# Patient Record
Sex: MALE | Race: WHITE | NOT HISPANIC OR LATINO | Employment: OTHER | URBAN - METROPOLITAN AREA
[De-identification: names, ages, dates, MRNs, and addresses within clinical notes are randomized per-mention and may not be internally consistent; named-entity substitution may affect disease eponyms.]

---

## 2023-02-27 LAB
CALCIDIOL (25 OH VITAMIN D3) (NG/ML) IN SER/PLAS: 22 NG/ML
CHOLESTEROL (MG/DL) IN SER/PLAS: 243 MG/DL (ref 0–199)
CHOLESTEROL IN HDL (MG/DL) IN SER/PLAS: 42.1 MG/DL
CHOLESTEROL/HDL RATIO: 5.8
COBALAMIN (VITAMIN B12) (PG/ML) IN SER/PLAS: 345 PG/ML (ref 211–911)
FASTING GLUCOSE (MG/DL) IN SER/PLAS: 97 MG/DL (ref 74–99)
INSULIN, FASTING: 12 UIU/ML (ref 3–25)
LDL: 148 MG/DL (ref 0–99)
MAGNESIUM (MG/DL) IN SER/PLAS: 1.99 MG/DL (ref 1.6–2.4)
NON HDL CHOLESTEROL: 201 MG/DL
THYROPEROXIDASE AB (IU/ML) IN SER/PLAS: 988 IU/ML
THYROTROPIN (MIU/L) IN SER/PLAS BY DETECTION LIMIT <= 0.05 MIU/L: 0.02 MIU/L (ref 0.44–3.98)
THYROXINE (T4) FREE (NG/DL) IN SER/PLAS: 1.55 NG/DL (ref 0.78–1.48)
TRIGLYCERIDE (MG/DL) IN SER/PLAS: 264 MG/DL (ref 0–149)
TRIIODOTHYRONINE (T3) FREE (PG/ML) IN SER/PLAS: 4.4 PG/ML (ref 2.3–4.2)
VLDL: 53 MG/DL (ref 0–40)

## 2023-03-01 LAB
ALANINE AMINOTRANSFERASE (SGPT) (U/L) IN SER/PLAS: 17 U/L (ref 10–52)
ALBUMIN (G/DL) IN SER/PLAS: 4.5 G/DL (ref 3.4–5)
ALKALINE PHOSPHATASE (U/L) IN SER/PLAS: 86 U/L (ref 33–120)
ANION GAP IN SER/PLAS: 12 MMOL/L (ref 10–20)
ASPARTATE AMINOTRANSFERASE (SGOT) (U/L) IN SER/PLAS: 14 U/L (ref 9–39)
BILIRUBIN TOTAL (MG/DL) IN SER/PLAS: 0.8 MG/DL (ref 0–1.2)
CALCIUM (MG/DL) IN SER/PLAS: 10.1 MG/DL (ref 8.6–10.6)
CARBON DIOXIDE, TOTAL (MMOL/L) IN SER/PLAS: 28 MMOL/L (ref 21–32)
CHLORIDE (MMOL/L) IN SER/PLAS: 104 MMOL/L (ref 98–107)
CREATININE (MG/DL) IN SER/PLAS: 0.94 MG/DL (ref 0.5–1.3)
GFR MALE: >90 ML/MIN/1.73M2
GLUCOSE (MG/DL) IN SER/PLAS: 95 MG/DL (ref 74–99)
POTASSIUM (MMOL/L) IN SER/PLAS: 5.2 MMOL/L (ref 3.5–5.3)
PROTEIN TOTAL: 6.8 G/DL (ref 6.4–8.2)
SODIUM (MMOL/L) IN SER/PLAS: 139 MMOL/L (ref 136–145)
UREA NITROGEN (MG/DL) IN SER/PLAS: 11 MG/DL (ref 6–23)

## 2023-03-03 LAB — VITAMIN B6: 65.8 NMOL/L (ref 20–125)

## 2023-03-05 LAB — VITAMIN B1, WHOLE BLOOD: 157 NMOL/L (ref 70–180)

## 2023-04-18 ENCOUNTER — APPOINTMENT (OUTPATIENT)
Dept: PRIMARY CARE | Facility: CLINIC | Age: 48
End: 2023-04-18
Payer: COMMERCIAL

## 2023-05-16 PROBLEM — G47.33 OSA (OBSTRUCTIVE SLEEP APNEA): Status: ACTIVE | Noted: 2023-05-16

## 2023-05-16 PROBLEM — R07.89 ATYPICAL CHEST PAIN: Status: ACTIVE | Noted: 2023-05-16

## 2023-05-16 PROBLEM — I47.29 VENTRICULAR TACHYCARDIA, MONOMORPHIC (MULTI): Status: ACTIVE | Noted: 2023-05-16

## 2023-05-16 PROBLEM — R19.5 LOOSE STOOLS: Status: ACTIVE | Noted: 2023-05-16

## 2023-05-16 PROBLEM — F33.9 DEPRESSION, MAJOR, RECURRENT (CMS-HCC): Status: ACTIVE | Noted: 2023-05-16

## 2023-05-16 PROBLEM — I47.29 NONSUSTAINED VENTRICULAR TACHYCARDIA (MULTI): Status: ACTIVE | Noted: 2023-05-16

## 2023-05-16 PROBLEM — R39.89 SENSATION OF PRESSURE IN BLADDER AREA: Status: ACTIVE | Noted: 2023-05-16

## 2023-05-16 PROBLEM — R55 SYNCOPE: Status: ACTIVE | Noted: 2023-05-16

## 2023-05-16 PROBLEM — Z95.810 STATUS POST INTERNAL CARDIAC DEFIBRILLATOR PROCEDURE: Status: ACTIVE | Noted: 2023-05-16

## 2023-05-16 PROBLEM — E78.5 HYPERLIPIDEMIA: Status: ACTIVE | Noted: 2023-05-16

## 2023-05-16 PROBLEM — E53.8 LOW SERUM VITAMIN B12: Status: ACTIVE | Noted: 2023-05-16

## 2023-05-16 PROBLEM — J32.9 CHRONIC CONGESTION OF PARANASAL SINUS: Status: ACTIVE | Noted: 2023-05-16

## 2023-05-16 PROBLEM — I42.9 CARDIOMYOPATHY (MULTI): Status: ACTIVE | Noted: 2023-05-16

## 2023-05-16 PROBLEM — R53.83 LACK OF ENERGY: Status: ACTIVE | Noted: 2023-05-16

## 2023-05-16 PROBLEM — R14.0 ABDOMINAL BLOATING: Status: ACTIVE | Noted: 2023-05-16

## 2023-05-16 PROBLEM — G89.29 CHRONIC PAIN: Status: ACTIVE | Noted: 2023-05-16

## 2023-05-16 PROBLEM — G62.9 NEUROPATHY: Status: ACTIVE | Noted: 2023-05-16

## 2023-05-16 PROBLEM — R40.0 DAYTIME SOMNOLENCE: Status: ACTIVE | Noted: 2023-05-16

## 2023-05-16 PROBLEM — F50.9 DISORDERED EATING: Status: ACTIVE | Noted: 2023-05-16

## 2023-05-16 PROBLEM — M54.9 BACK PAIN, CHRONIC: Status: ACTIVE | Noted: 2023-05-16

## 2023-05-16 PROBLEM — G89.29 BACK PAIN, CHRONIC: Status: ACTIVE | Noted: 2023-05-16

## 2023-05-16 PROBLEM — I50.22 CHRONIC SYSTOLIC HEART FAILURE (MULTI): Status: ACTIVE | Noted: 2023-05-16

## 2023-05-16 PROBLEM — M25.551 RIGHT HIP PAIN: Status: ACTIVE | Noted: 2023-05-16

## 2023-05-16 PROBLEM — R06.09 DOE (DYSPNEA ON EXERTION): Status: ACTIVE | Noted: 2023-05-16

## 2023-05-16 PROBLEM — R68.89 SOMATIC COMPLAINTS, MULTIPLE: Status: ACTIVE | Noted: 2023-05-16

## 2023-05-16 PROBLEM — I10 HTN (HYPERTENSION): Status: ACTIVE | Noted: 2023-05-16

## 2023-05-16 PROBLEM — K58.0 IRRITABLE BOWEL SYNDROME WITH DIARRHEA: Status: ACTIVE | Noted: 2023-05-16

## 2023-05-16 PROBLEM — M54.50 LOWER BACK PAIN: Status: ACTIVE | Noted: 2023-05-16

## 2023-05-16 PROBLEM — S39.011A ABDOMINAL MUSCLE STRAIN: Status: ACTIVE | Noted: 2023-05-16

## 2023-05-16 PROBLEM — R00.0 SINUS TACHYCARDIA: Status: ACTIVE | Noted: 2023-05-16

## 2023-05-16 PROBLEM — R10.9 ABDOMINAL PAIN: Status: ACTIVE | Noted: 2023-05-16

## 2023-05-16 PROBLEM — M62.838 MUSCLE SPASMS OF NECK: Status: ACTIVE | Noted: 2023-05-16

## 2023-05-16 PROBLEM — G47.01 INSOMNIA DUE TO MEDICAL CONDITION: Status: ACTIVE | Noted: 2023-05-16

## 2023-05-16 PROBLEM — R10.9 RIGHT FLANK PAIN: Status: ACTIVE | Noted: 2023-05-16

## 2023-05-16 PROBLEM — R13.10 DYSPHAGIA: Status: ACTIVE | Noted: 2023-05-16

## 2023-05-16 PROBLEM — R20.2 PARESTHESIAS: Status: ACTIVE | Noted: 2023-05-16

## 2023-05-16 PROBLEM — H53.8 BLURRY VISION, RIGHT EYE: Status: ACTIVE | Noted: 2023-05-16

## 2023-05-16 PROBLEM — E55.9 VITAMIN D DEFICIENCY: Status: ACTIVE | Noted: 2023-05-16

## 2023-05-16 PROBLEM — K63.8219 SMALL INTESTINAL BACTERIAL OVERGROWTH: Status: ACTIVE | Noted: 2023-05-16

## 2023-05-16 PROBLEM — E03.9 HYPOTHYROIDISM: Status: ACTIVE | Noted: 2023-05-16

## 2023-05-16 PROBLEM — R51.9 FREQUENT HEADACHES: Status: ACTIVE | Noted: 2023-05-16

## 2023-05-16 PROBLEM — M54.2 NECK PAIN: Status: ACTIVE | Noted: 2023-05-16

## 2023-05-16 PROBLEM — M47.816 LUMBAR SPONDYLOSIS: Status: ACTIVE | Noted: 2023-05-16

## 2023-05-16 PROBLEM — R06.02 SHORTNESS OF BREATH: Status: ACTIVE | Noted: 2023-05-16

## 2023-05-16 PROBLEM — K46.9 HERNIA, ABDOMINAL: Status: ACTIVE | Noted: 2023-05-16

## 2023-05-17 ENCOUNTER — LAB (OUTPATIENT)
Dept: LAB | Facility: LAB | Age: 48
End: 2023-05-17
Payer: COMMERCIAL

## 2023-05-17 ENCOUNTER — OFFICE VISIT (OUTPATIENT)
Dept: PRIMARY CARE | Facility: CLINIC | Age: 48
End: 2023-05-17
Payer: COMMERCIAL

## 2023-05-17 VITALS — DIASTOLIC BLOOD PRESSURE: 80 MMHG | BODY MASS INDEX: 25.99 KG/M2 | WEIGHT: 197 LBS | SYSTOLIC BLOOD PRESSURE: 120 MMHG

## 2023-05-17 DIAGNOSIS — R10.84 GENERALIZED ABDOMINAL PAIN: ICD-10-CM

## 2023-05-17 DIAGNOSIS — E03.9 HYPOTHYROIDISM, UNSPECIFIED TYPE: Primary | ICD-10-CM

## 2023-05-17 DIAGNOSIS — E03.9 HYPOTHYROIDISM, UNSPECIFIED TYPE: ICD-10-CM

## 2023-05-17 PROCEDURE — 99213 OFFICE O/P EST LOW 20 MIN: CPT | Performed by: INTERNAL MEDICINE

## 2023-05-17 PROCEDURE — 84481 FREE ASSAY (FT-3): CPT

## 2023-05-17 PROCEDURE — 3074F SYST BP LT 130 MM HG: CPT | Performed by: INTERNAL MEDICINE

## 2023-05-17 PROCEDURE — 84443 ASSAY THYROID STIM HORMONE: CPT

## 2023-05-17 PROCEDURE — 36415 COLL VENOUS BLD VENIPUNCTURE: CPT

## 2023-05-17 PROCEDURE — 3079F DIAST BP 80-89 MM HG: CPT | Performed by: INTERNAL MEDICINE

## 2023-05-17 PROCEDURE — 84439 ASSAY OF FREE THYROXINE: CPT

## 2023-05-17 RX ORDER — METOPROLOL SUCCINATE 50 MG/1
1 TABLET, EXTENDED RELEASE ORAL DAILY
COMMUNITY
Start: 2017-04-24 | End: 2023-11-14 | Stop reason: SDUPTHER

## 2023-05-17 RX ORDER — LOSARTAN POTASSIUM 50 MG/1
1 TABLET ORAL DAILY
COMMUNITY
Start: 2023-04-25

## 2023-05-17 RX ORDER — MAGNESIUM 200 MG
TABLET ORAL
COMMUNITY
Start: 2023-03-06

## 2023-05-17 ASSESSMENT — LIFESTYLE VARIABLES
HOW OFTEN DO YOU HAVE A DRINK CONTAINING ALCOHOL: NEVER
HOW OFTEN DO YOU HAVE SIX OR MORE DRINKS ON ONE OCCASION: NEVER
SKIP TO QUESTIONS 9-10: 1
AUDIT-C TOTAL SCORE: 0
HOW MANY STANDARD DRINKS CONTAINING ALCOHOL DO YOU HAVE ON A TYPICAL DAY: PATIENT DOES NOT DRINK

## 2023-05-17 ASSESSMENT — PATIENT HEALTH QUESTIONNAIRE - PHQ9
1. LITTLE INTEREST OR PLEASURE IN DOING THINGS: NOT AT ALL
SUM OF ALL RESPONSES TO PHQ9 QUESTIONS 1 AND 2: 0
SUM OF ALL RESPONSES TO PHQ9 QUESTIONS 1 & 2: 0
1. LITTLE INTEREST OR PLEASURE IN DOING THINGS: NOT AT ALL
2. FEELING DOWN, DEPRESSED OR HOPELESS: NOT AT ALL
2. FEELING DOWN, DEPRESSED OR HOPELESS: NOT AT ALL

## 2023-05-17 ASSESSMENT — ENCOUNTER SYMPTOMS
OCCASIONAL FEELINGS OF UNSTEADINESS: 1
LOSS OF SENSATION IN FEET: 0
DEPRESSION: 0

## 2023-05-17 NOTE — PROGRESS NOTES
Subjective   Patient ID: Stuart Merlos is a 48 y.o. male who presents for Follow-up.  HPI    Review of Systems    Objective   Physical Exam  Constitutional:       Appearance: He is well-developed.   Cardiovascular:      Rate and Rhythm: Normal rate and regular rhythm.      Heart sounds: Normal heart sounds. No murmur heard.  Pulmonary:      Effort: Pulmonary effort is normal.      Breath sounds: Normal breath sounds.   Abdominal:      General: Bowel sounds are normal.      Palpations: Abdomen is soft.         Assessment/Plan   Problem List Items Addressed This Visit          Nervous    Abdominal pain       Endocrine/Metabolic    Hypothyroidism - Primary    Relevant Orders    TSH with reflex to Free T4 if abnormal    T4, free    T3    T3, free     Stopped taking levothyroxine completely 10 weeks ago  Feeling much better  He stopped Entresto as well  On Losartan and Toprol now  Feeling better, but not 100% yet    We are rechecking TSH, T4, T3 today.  Further steps pending BW results       Mora Moscoso MD

## 2023-05-18 LAB
THYROTROPIN (MIU/L) IN SER/PLAS BY DETECTION LIMIT <= 0.05 MIU/L: 4 MIU/L (ref 0.44–3.98)
THYROXINE (T4) FREE (NG/DL) IN SER/PLAS: 1.01 NG/DL (ref 0.78–1.48)
TRIIODOTHYRONINE (T3) (NG/DL) IN SER/PLAS: 109 NG/DL (ref 60–200)
TRIIODOTHYRONINE (T3) FREE (PG/ML) IN SER/PLAS: 3.3 PG/ML (ref 2.3–4.2)

## 2023-09-06 LAB
CALCIDIOL (25 OH VITAMIN D3) (NG/ML) IN SER/PLAS: 44 NG/ML
COBALAMIN (VITAMIN B12) (PG/ML) IN SER/PLAS: >2000 PG/ML (ref 211–911)
THYROTROPIN (MIU/L) IN SER/PLAS BY DETECTION LIMIT <= 0.05 MIU/L: 2.52 MIU/L (ref 0.44–3.98)
THYROXINE (T4) FREE (NG/DL) IN SER/PLAS: 1.19 NG/DL (ref 0.78–1.48)
TRIIODOTHYRONINE (T3) FREE (PG/ML) IN SER/PLAS: 4.3 PG/ML (ref 2.3–4.2)

## 2023-09-09 LAB
IGF 1 (INSULIN-LIKE GROWTH FACTOR 1): 197 NG/ML (ref 68–225)
IGF 1 Z SCORE CALCULATION: 1.4
THYROGLOBULIN AB (IU/ML) IN SER/PLAS: 2.7 IU/ML (ref 0–4)
TSH RECEPTOR ANTIBODY: <0.8 IU/L

## 2023-09-11 LAB
METANEPHRINE: <0.2 NMOL/L
NORMETANEPHRINE: 0.49 NMOL/L

## 2023-09-20 LAB — THYROID STIMULATING IMMUNOGLOBULIN: <1 TSI INDEX

## 2023-10-10 ENCOUNTER — HOSPITAL ENCOUNTER (OUTPATIENT)
Dept: CARDIOLOGY | Facility: CLINIC | Age: 48
Discharge: HOME | End: 2023-10-10
Payer: COMMERCIAL

## 2023-10-10 DIAGNOSIS — Z95.810 PRESENCE OF AUTOMATIC CARDIOVERTER/DEFIBRILLATOR (AICD): ICD-10-CM

## 2023-10-10 DIAGNOSIS — I42.9 CARDIOMYOPATHY, UNSPECIFIED TYPE (MULTI): ICD-10-CM

## 2023-10-10 PROCEDURE — 93296 REM INTERROG EVL PM/IDS: CPT

## 2023-11-14 DIAGNOSIS — I42.9 CARDIOMYOPATHY, UNSPECIFIED TYPE (MULTI): Primary | ICD-10-CM

## 2023-11-14 RX ORDER — METOPROLOL SUCCINATE 50 MG/1
50 TABLET, EXTENDED RELEASE ORAL DAILY
Qty: 90 TABLET | Refills: 3 | Status: SHIPPED | OUTPATIENT
Start: 2023-11-14

## 2023-12-22 DIAGNOSIS — I42.9 CARDIOMYOPATHY, UNSPECIFIED TYPE (MULTI): Primary | ICD-10-CM

## 2023-12-22 RX ORDER — SACUBITRIL AND VALSARTAN 49; 51 MG/1; MG/1
TABLET, FILM COATED ORAL
Status: CANCELLED | OUTPATIENT
Start: 2023-12-22

## 2023-12-22 RX ORDER — SACUBITRIL AND VALSARTAN 49; 51 MG/1; MG/1
TABLET, FILM COATED ORAL
COMMUNITY
Start: 2023-11-13 | End: 2023-12-26 | Stop reason: SDUPTHER

## 2023-12-27 RX ORDER — SACUBITRIL AND VALSARTAN 49; 51 MG/1; MG/1
1 TABLET, FILM COATED ORAL 2 TIMES DAILY
Qty: 60 TABLET | Refills: 11 | Status: SHIPPED | OUTPATIENT
Start: 2023-12-27

## 2024-01-04 ENCOUNTER — OFFICE VISIT (OUTPATIENT)
Dept: PRIMARY CARE | Facility: CLINIC | Age: 49
End: 2024-01-04
Payer: COMMERCIAL

## 2024-01-04 VITALS
DIASTOLIC BLOOD PRESSURE: 74 MMHG | BODY MASS INDEX: 24.78 KG/M2 | OXYGEN SATURATION: 98 % | HEIGHT: 73 IN | SYSTOLIC BLOOD PRESSURE: 113 MMHG | WEIGHT: 187 LBS | RESPIRATION RATE: 16 BRPM | TEMPERATURE: 98 F | HEART RATE: 74 BPM

## 2024-01-04 DIAGNOSIS — R10.9 CHRONIC ABDOMINAL PAIN: Primary | ICD-10-CM

## 2024-01-04 DIAGNOSIS — H91.90 DECREASED HEARING, UNSPECIFIED LATERALITY: ICD-10-CM

## 2024-01-04 DIAGNOSIS — G89.29 CHRONIC ABDOMINAL PAIN: Primary | ICD-10-CM

## 2024-01-04 PROCEDURE — 3078F DIAST BP <80 MM HG: CPT | Performed by: FAMILY MEDICINE

## 2024-01-04 PROCEDURE — 3074F SYST BP LT 130 MM HG: CPT | Performed by: FAMILY MEDICINE

## 2024-01-04 PROCEDURE — 99214 OFFICE O/P EST MOD 30 MIN: CPT | Performed by: FAMILY MEDICINE

## 2024-01-04 PROCEDURE — 1036F TOBACCO NON-USER: CPT | Performed by: FAMILY MEDICINE

## 2024-01-04 NOTE — PROGRESS NOTES
"Subjective   Patient ID: Stuart Merlos is a 48 y.o. male who presents for GI Problem (Stomach pain/Poor appetite ).    HPI   Initial visit.    Reports right abdominal pain x at least 7 yrs.  Started when he felt a pop in the right groin 10 yrs ago.  Right groin pain slowly moved to the right abdominal over the next 3 yrs.  Started getting constipation after that.  Pain \"feels like pop rocks, like an active volcano.\"  Feels like he has food poisoning every day, stomach feels like it's grinding or flipping on itself.  Constant.  Worse w/eating sugar.  Improved w/laying down on left side.  Rated at max 10/10.  Rated on average 8/10.  Asso w/nausea, vomiting.  No blood in the stool.  Currently seeing GI for this (last note indicates considering possible psych eval vs 2nd opinion for GI complaint).  Patient declines psych referral.  Wants 2nd opinion.  Had multiple CT abdomen/pelvis, nuc med scan liver/spleen.  Colonoscopy up to date.    States headache is connected to the abdominal complaint.  Ears sound like they have a drum in them.  Has hearing changes (can't hear low sounds, only hears midrange and high sounds, wife says he keeps the TV up loud).    Review of Systems  No other complaints.     Objective   /74   Pulse 74   Temp 36.7 °C (98 °F)   Resp 16   Ht 1.854 m (6' 1\")   Wt 84.8 kg (187 lb)   SpO2 98%   BMI 24.67 kg/m²     Physical Exam  Constitutional:       General: He is not in acute distress.     Appearance: He is normal weight.   HENT:      Right Ear: Tympanic membrane normal.      Left Ear: Tympanic membrane normal.   Neck:      Vascular: No carotid bruit.   Cardiovascular:      Rate and Rhythm: Normal rate and regular rhythm.      Heart sounds: Normal heart sounds. No murmur heard.     No friction rub.   Pulmonary:      Effort: Pulmonary effort is normal.      Breath sounds: Normal breath sounds. No wheezing, rhonchi or rales.   Abdominal:      General: Bowel sounds are normal. There is no " distension.      Palpations: Abdomen is soft. There is no mass.      Tenderness: There is abdominal tenderness (mild, right). There is no guarding or rebound.   Neurological:      General: No focal deficit present.      Mental Status: He is oriented to person, place, and time.   Psychiatric:         Mood and Affect: Mood normal.         Behavior: Behavior normal.     Assessment/Plan   Diagnoses and all orders for this visit:  Chronic abdominal pain  -     Referral to Gastroenterology; Future  Decreased hearing, unspecified laterality  -     Referral to Audiology; Future    GI and audiology referrals.    Schedule annual wellness visit.

## 2024-01-11 ENCOUNTER — OFFICE VISIT (OUTPATIENT)
Dept: GASTROENTEROLOGY | Facility: HOSPITAL | Age: 49
End: 2024-01-11
Payer: COMMERCIAL

## 2024-01-11 VITALS
HEIGHT: 73 IN | OXYGEN SATURATION: 98 % | DIASTOLIC BLOOD PRESSURE: 86 MMHG | TEMPERATURE: 97.3 F | SYSTOLIC BLOOD PRESSURE: 133 MMHG | RESPIRATION RATE: 18 BRPM | BODY MASS INDEX: 24.78 KG/M2 | WEIGHT: 187 LBS | HEART RATE: 96 BPM

## 2024-01-11 DIAGNOSIS — G89.29 CHRONIC ABDOMINAL PAIN: ICD-10-CM

## 2024-01-11 DIAGNOSIS — R10.9 CHRONIC ABDOMINAL PAIN: ICD-10-CM

## 2024-01-11 PROCEDURE — 3079F DIAST BP 80-89 MM HG: CPT | Performed by: STUDENT IN AN ORGANIZED HEALTH CARE EDUCATION/TRAINING PROGRAM

## 2024-01-11 PROCEDURE — 1036F TOBACCO NON-USER: CPT | Performed by: STUDENT IN AN ORGANIZED HEALTH CARE EDUCATION/TRAINING PROGRAM

## 2024-01-11 PROCEDURE — 3075F SYST BP GE 130 - 139MM HG: CPT | Performed by: STUDENT IN AN ORGANIZED HEALTH CARE EDUCATION/TRAINING PROGRAM

## 2024-01-11 PROCEDURE — 99215 OFFICE O/P EST HI 40 MIN: CPT | Performed by: STUDENT IN AN ORGANIZED HEALTH CARE EDUCATION/TRAINING PROGRAM

## 2024-01-11 SDOH — ECONOMIC STABILITY: FOOD INSECURITY: WITHIN THE PAST 12 MONTHS, YOU WORRIED THAT YOUR FOOD WOULD RUN OUT BEFORE YOU GOT MONEY TO BUY MORE.: NEVER TRUE

## 2024-01-11 SDOH — ECONOMIC STABILITY: FOOD INSECURITY: WITHIN THE PAST 12 MONTHS, THE FOOD YOU BOUGHT JUST DIDN'T LAST AND YOU DIDN'T HAVE MONEY TO GET MORE.: NEVER TRUE

## 2024-01-11 ASSESSMENT — PAIN SCALES - GENERAL: PAINLEVEL: 8

## 2024-01-11 ASSESSMENT — ENCOUNTER SYMPTOMS
OCCASIONAL FEELINGS OF UNSTEADINESS: 0
LOSS OF SENSATION IN FEET: 0
DEPRESSION: 0

## 2024-01-11 ASSESSMENT — PATIENT HEALTH QUESTIONNAIRE - PHQ9
2. FEELING DOWN, DEPRESSED OR HOPELESS: NOT AT ALL
SUM OF ALL RESPONSES TO PHQ9 QUESTIONS 1 AND 2: 0
1. LITTLE INTEREST OR PLEASURE IN DOING THINGS: NOT AT ALL

## 2024-01-11 ASSESSMENT — COLUMBIA-SUICIDE SEVERITY RATING SCALE - C-SSRS
2. HAVE YOU ACTUALLY HAD ANY THOUGHTS OF KILLING YOURSELF?: NO
1. IN THE PAST MONTH, HAVE YOU WISHED YOU WERE DEAD OR WISHED YOU COULD GO TO SLEEP AND NOT WAKE UP?: NO
6. HAVE YOU EVER DONE ANYTHING, STARTED TO DO ANYTHING, OR PREPARED TO DO ANYTHING TO END YOUR LIFE?: NO

## 2024-01-11 NOTE — PROGRESS NOTES
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                          preparation was evaluated using the BBPS ([place] Bowel                          Preparation Scale) with scores of: Right Colon = 2                          (minor amount of residual staining, small fragments of                          stool and/or opaque liquid, but mucosa seen well),                          Transverse Colon = 3 (entire mucosa seen well with no                          residual staining, small fragments of stool or opaque                          liquid) and Left Colon = 3 (entire mucosa seen well                          with no residual staining, small fragments of stool or                          opaque liquid). The total BBPS score equals 8. The                          quality of the bowel preparation was good. The                          terminal ileum, ileocecal valve, appendiceal orifice,                          and rectum were photographed. Retroflexion was                          performed in the rectum and  cecum.  Findings:       The perianal and digital rectal examinations were normal.       Non-bleeding internal hemorrhoids were found during retroflexion. The        hemorrhoids were small and Grade I (internal hemorrhoids that do not        prolapse).       The terminal ileum appeared normal.       Normal mucosa was found in the right colon. Biopsies were taken with a        cold forceps for histology. Estimated blood loss was minimal. The        specimens were placed in Jar A.       A 5 mm polyp was found in the proximal sigmoid colon. The polyp was        sessile. The polyp was removed with a cold snare. Resection and        retrieval were complete. Estimated blood loss was minimal. The specimen        was placed in Jar B.       The exam was otherwise without abnormality.  Moderate Sedation:       N/A  Estimated Blood Loss:       Estimated blood loss was minimal.  Impression:            - Non-bleeding internal hemorrhoids.                         - The examined portion of the terminal ileum was                          normal.                         - Normal mucosa in the right colon. Biopsied.                         - One 5 mm polyp in the proximal sigmoid colon,                          removed with a cold snare. Resected and retrieved.                         - The examination was otherwise normal.  Recommendation:        - Await pathology results.                         - Repeat colonoscopy in 5 years for surveillance based                          on pathology results.                         - Resume previous diet.                         - Continue present medications.                         - Patient has a contact number available for                          emergencies. The signs and symptoms of potential                          delayed complications were discussed with the patient.                          Return to normal activities tomorrow. Written                          discharge instructions  were provided to the patient.                         - Discharge patient to home (with escort).                         - Return to my office PRN.  Attending Participation:       I personally performed the entire procedure.  Branden Murphy MD  1/15/2021 12:58:52 PM  This report has been signed electronically.  Number of Addenda: 0  Note Initiated On: 1/15/2021 12:26 PM  Scope Withdrawal Time 0 hours 11 minutes 36 seconds   Total Procedure Duration Time 0 hours 18 minutes 2 seconds     EGD  Procedure Date: 9/26/2018 2:15 PM  MRN: [id number]  YOB: 1975  Admit Type: Outpatient  Age: 43  Gender: Male  Note Status: Finalized  Attending MD: Kiran Fofana MD  Sedation Initiated: 1428 PM  Procedure:            Upper GI endoscopy  Indications:          Generalized abdominal pain  Providers:            Kiran Fofana MD  Patient Profile:      This is a 43 year old male. Refer to note in patient                       chart for documentation of history and physical.                       Patient has symptoms of chronic abdominal pain.  Referring Physician:  Medicines:            Fentanyl 150 micrograms IV, Midazolam 7 mg IV,                       Diphenhydramine 50 mg IV  Complications:        No immediate complications.  Requesting Provider:  Procedure:            Pre-Anesthesia Assessment:                       - Prior to the procedure, a History and Physical was                       performed, and patient medications and allergies                       were reviewed. The patient is competent. The risks                       and benefits of the procedure and the sedation                       options and risks were discussed with the patient.                       All questions were answered and informed consent was                       obtained. Patient identification and proposed                       procedure were verified by the physician and the                       nurse in the  pre-procedure area in the endoscopy                       suite. Mental Status Examination: alert and                       oriented. Airway Examination: normal oropharyngeal                       airway and neck mobility. Respiratory Examination:                       clear to auscultation. CV Examination: normal.                       Prophylactic Antibiotics: The patient does not                       require prophylactic antibiotics. Prior                       Anticoagulants: The patient has taken no previous                       anticoagulant or antiplatelet agents. ASA Grade                       Assessment: III - A patient with severe systemic                       disease. After reviewing the risks and benefits, the                       patient was deemed in satisfactory condition to                       undergo the procedure. The anesthesia plan was to                       use moderate sedation / analgesia (conscious                       sedation). Immediately prior to administration of                       medications, the patient was re-assessed for                       adequacy to receive sedatives. The heart rate,                       respiratory rate, oxygen saturations, blood                       pressure, adequacy of pulmonary ventilation, and                       response to care were monitored throughout the                       procedure. The physical status of the patient was                       re-assessed after the procedure.                       After obtaining informed consent, the endoscope was                       passed under direct vision. Throughout the                       procedure, the patient's blood pressure, pulse, and                       oxygen saturations were monitored continuously. The                       Endoscope was introduced through the mouth, and                       advanced to the second part of duodenum. The upper                       GI  endoscopy was accomplished without difficulty.                       The patient tolerated the procedure well.  Findings:      The esophagus was normal.      Segmental mild inflammation characterized by erythema was found in      the gastric antrum. Biopsies were taken with a cold forceps for      Helicobacter pylori testing. Verification of patient identification      for the specimen was done by the physician and nurse using the      patient's name, birth date and medical record number. Estimated blood      loss was minimal.      The duodenal bulb, first portion of the duodenum and second portion      of the duodenum were normal. Biopsies for histology were taken with a      cold forceps for evaluation of celiac disease. Verification of      patient identification for the specimen was done by the physician and      nurse using the patient's name, birth date and medical record number.      Estimated blood loss was minimal.  Impression:           - Normal esophagus.                       - Gastritis. Biopsied.                       - Normal duodenal bulb, first portion of the                       duodenum and second portion of the duodenum.                       Biopsied.  Estimated Blood Loss: Estimated blood loss was minimal.  Recommendation:       - Discharge patient to home (ambulatory).                       - Resume previous diet.                       - Continue present medications.                       - Await pathology results.                       - Return to referring physician.                       - Patient has a contact number available for                       emergencies. The signs and symptoms of potential                       delayed complications were discussed with the                       patient. Return to normal activities tomorrow.                       Written discharge instructions were provided to the                       patient.  Procedure Code(s):    --- Professional ---                        18269  Diagnosis Code(s):    --- Professional ---                       K29.70                       R10.84  CPT copyright 2016 American Medical Association. All rights reserved.  Attending Participation:      I personally performed the entire procedure.  Scope In: 2:41:56 PM  Scope Out: 2:47:44 PM  MD Kiran Bolton MD  9/26/2018 2:50:35 PM  This report has been signed electronically by Kiran Fofana MD  Number of Addenda: 0  Note Initiated On: 9/26/2018 2:15 PM     Colonoscopy  Patient Name: Stuart Merlos  Procedure Date: 9/26/2018 2:05 PM  MRN: [id number]  YOB: 1975  Admit Type: Outpatient  Age: 43  Gender: Male  Note Status: Finalized  Attending MD: Kiran Fofana MD  Sedation Initiated: 1455 PM  Procedure:            Colonoscopy  Indications:          Clinically significant diarrhea of unexplained origin  Providers:            Kiran Fofana MD  Patient Profile:      This is a 43 year old male. Refer to note in patient                       chart for documentation of history and physical.                       Last Colonoscopy: none. The patient's first                       colonoscopy is today.  Referring Physician:  Medicines:            None  Complications:        No immediate complications.  Requesting Provider:  Procedure:            Pre-Anesthesia Assessment:                       - Prior to the procedure, a History and Physical was                       performed, and patient medications and allergies                       were reviewed. The patient is competent. The risks                       and benefits of the procedure and the sedation                       options and risks were discussed with the patient.                       All questions were answered and informed consent was                       obtained. Patient identification and proposed                       procedure were verified by the physician and the                        nurse in the pre-procedure area in the endoscopy                       suite. Mental Status Examination: alert and                       oriented. Airway Examination: normal oropharyngeal                       airway and neck mobility. Respiratory Examination:                       clear to auscultation. CV Examination: normal.                       Prophylactic Antibiotics: The patient does not                       require prophylactic antibiotics. Prior                       Anticoagulants: The patient has taken no previous                       anticoagulant or antiplatelet agents. ASA Grade                       Assessment: III - A patient with severe systemic                       disease. After reviewing the risks and benefits, the                       patient was deemed in satisfactory condition to                       undergo the procedure. The anesthesia plan was to                       use moderate sedation / analgesia (conscious                       sedation). Immediately prior to administration of                       medications, the patient was re-assessed for                       adequacy to receive sedatives. The heart rate,                       respiratory rate, oxygen saturations, blood                       pressure, adequacy of pulmonary ventilation, and                       response to care were monitored throughout the                       procedure. The physical status of the patient was                       re-assessed after the procedure.                       After I obtained informed consent, the scope was                       passed under direct vision. Throughout the                       procedure, the patient's blood pressure, pulse, and                       oxygen saturations were monitored continuously. The                       Colonoscope was introduced through the anus and                       advanced to the cecum, identified by appendiceal                        orifice and ileocecal valve. The colonoscopy was                       performed without difficulty. The patient tolerated                       the procedure well. The quality of the bowel                       preparation was good. The ileocecal valve,                       appendiceal orifice, and rectum were photographed.  Findings:      The perianal and digital rectal examinations were normal.      The entire examined colon appeared normal on direct and retroflexion      views. Biopsies for histology were taken with a cold forceps from the      ascending colon, transverse colon, descending colon and sigmoid colon      for evaluation of microscopic colitis. Verification of patient      identification for the specimen was done by the physician and nurse      using the patient's name, birth date and medical record number.      Estimated blood loss was minimal.  Impression:           - No specimens collected.  Estimated Blood Loss: Estimated blood loss was minimal. Estimated blood                       loss was minimal. Estimated blood loss: none.  Recommendation:       - Discharge patient to home.                       - Resume previous diet.                       - Continue present medications.                       - Await pathology results.                       - Repeat colonoscopy in 10 years for surveillance.                       - Return to referring physician.                       - Patient has a contact number available for                       emergencies. The signs and symptoms of potential                       delayed complications were discussed with the                       patient. Return to normal activities tomorrow.                       Written discharge instructions were provided to the                       patient.  Procedure Code(s):    --- Professional ---                       13029  Diagnosis Code(s):    --- Professional ---                       R19.7  CPT copyright 2016 American  Medical Association. All rights reserved.  Attending Participation:      I personally performed the entire procedure.  Scope In: 2:55:15 PM  Scope Out: 3:10:07 PM  MD Kiran Bolton MD  9/26/2018 3:12:59 PM  This report has been signed electronically by Kiran Fofana MD  Number of Addenda: 0  Note Initiated On: 9/26/2018 2:05 PM     Assessment and Plan:  #Abdominal Pain   > Has continuous abdominal pain for numerous year following a Jets[employer] accident   > Pain is described as a girdle like twisting sensation in the RUQ that is continous   > Also has 3 bowel movements in the morning that relieves these symptoms   > Eating worsens the pain therefore he does not eat until the evening   >>Patient has thus far had a negative work up which included multiple abdominal CT, abdominal MRI, abdominal ultrasound, EGD, colonoscopy, and laparoscopic abdominal hernia repair that did not reveal an organic cause for his abdominal pain   >> Patient is depressed and anxious as he has this pain that is reproduced on exam however no cause has thus far been determined thus expectedly saddened   >> Trial dicyclomine, desipramine, and duloxetine for this pain with only modest benefit   >> Currently on  Amitriptyline which causes severe morning grogginess when awakening   >> Has not been under the care of psychiatry to effectively treat his symptoms and for mediation management   >> Patient likely has severe IBS symptoms made worse by depression and anxiety   >> Patient would not benefit from repeated abdominal imaging that would further expose him to ionizing radiation given the numerous previous studies   >> We will focus on mood and mental health symptoms before repeating invasive studies   - abdominal ultrasound - completed, unremarkable findings   - H pylori stool antigen testing   - Referral to psychiatry - Dr Rangel    #Return to clinic: patient to return in 4-6 after completing above studies and  psychiatry referral     Case discussed with attending Deejay Gross MD, PhD     Efrain Grullon MD, PhD  Gastroenterology Fellow, PGY5

## 2024-01-11 NOTE — PATIENT INSTRUCTIONS
#Abdominal Pain   1) Complete abdominal ultrasound   2) Please complete stool antigen test for H pylori   3) Please follow up with psychiatry:  Dr Rangel  [facility]  [id number] [place] [address], [address]  [place] [address]    phone [phone]  fax [phone]

## 2024-01-11 NOTE — PROGRESS NOTES
"AUDIOMETRIC EVALUATION      Name: Stuart Merlos  : 1975  Age: 48 y.o.  Date of Evaluation: 2024  Time: ***    HISTORY   Stuart Merlos, age 48, was seen today for an initial hearing evaluation. He reported     He denied any ear pain, pressure, fullness, tinnitus, dizziness, or ear trauma.     PROCEDURE     Otoscopy:   Right Ear: {otoscopy:24641::\"Clear ear canal with visible tympanic membrane\"}  Left Ear: {otoscopy:40182::\"Clear ear canal with visible tympanic membrane\"}    Tympanometry: 226 Hz probe tone  Right Ear: {tympanometry:85365::\"Normal ear canal volume, peak pressure, and compliance, consistent with normal middle ear function (Type A).\"}  Left Ear: {tympanometry:61606::\"Normal ear canal volume, peak pressure, and compliance, consistent with normal middle ear function (Type A).\"}     Ipsilateral Acoustic Reflexes:   Right Ear: {AR:29647::\"Present 500-4000 Hz.\"}  Left Ear: {AR:65889::\"Present 500-4000 Hz.\"}    Distortion-Product Otoacoustic Emissions (DPOAEs):  Right Ear: {OAES:28123}  Left Ear: {OAES:23343}    Pure Tone Audiometry: {method:29358::\"Conventional Audiometry\"} via {transducer:69264::\"inserts\"} with {Reliability:67589::\"good\"} reliability  Right Ear: ***  Left Ear: ***    Speech Audiometry:   Right Ear:    Speech reception threshold: *** dB HL  Word Recognition Score was ***% when presented at *** dB HL. Recorded NU6 list utilized.   Left Ear:   Speech reception threshold: *** dB HL  Word Recognition Score was ***% when presented at  *** dB HL. Recorded NU6 list utilized.     IMPRESSIONS AND RECOMMENDATIONS      Results were discussed with patient. Results indicate bilateral mobile and intact tympanic membranes, normal hearing in the right ear, and normal hearing in the left ear. This is not a significant change bilaterally compared to testing completed X.     Hearing aids were discussed as a management option for hearing loss. It was recommended that the patient contact [his/her] " insurance to determine if any hearing aid benefit is available through [facility]. [He/She] was provided the scheduling contact information for a hearing aid consult. Patient declined to pursue hearing aids at this time.     TREATMENT PLAN     Follow up with ENT. Appointment scheduled for today with .  Patient was provided the ENT scheduling contact information.  Retest hearing annually or sooner if concerns arise  in conjunction with medical care  Hearing aids pending medical clearance and patient readiness  Follow up with medical providers as indicated  Continue use of hearing aids      GABRIEL Trinidad under the supervision of Pato Robins CCC-A CCV[facility]      AUDIOGRAM

## 2024-01-12 ENCOUNTER — CLINICAL SUPPORT (OUTPATIENT)
Dept: AUDIOLOGY | Facility: CLINIC | Age: 49
End: 2024-01-12
Payer: COMMERCIAL

## 2024-01-12 DIAGNOSIS — H93.293 ABNORMAL AUDITORY PERCEPTION OF BOTH EARS: ICD-10-CM

## 2024-01-12 DIAGNOSIS — H93.8X3 PRESSURE SENSATION IN BOTH EARS: Primary | ICD-10-CM

## 2024-01-12 DIAGNOSIS — H91.90 DECREASED HEARING, UNSPECIFIED LATERALITY: ICD-10-CM

## 2024-01-12 PROCEDURE — 92550 TYMPANOMETRY & REFLEX THRESH: CPT

## 2024-01-12 PROCEDURE — 92557 COMPREHENSIVE HEARING TEST: CPT

## 2024-01-12 NOTE — PROGRESS NOTES
"ADULT AUDIOMETRIC EVALUATION      Name:  Stuart Merlos  :  1975  Age:  48 y.o.  Date of Evaluation:  2024    IMPRESSIONS     Today's test results are consistent with normal hearing sensitivity, bilaterally. Discussed results and recommendations with patient.  Questions were addressed and the patient was encouraged to contact our department should concerns arise.    RECOMMENDATIONS     Given complaints of ear pressure/eye pressure with other auditory symptoms, recommend follow up with ENT. Call to schedule an appointment at [phone].  Return for annual hearing evaluation or sooner should concerns arise.    Time: 5107-3328    HISTORY     Stuart Merlos is seen today at the request of Andrew Fontenot MD for an evaluation of hearing.  Patient reported intermittent head and ear pressure daily starting several years ago. He notes associated jaw cracking, eye discharge, and disorientation at times. Patient also reported gradually reduced hearing given need for television at a louder volume and notable changes in hearing music. His symptoms are worse when in background noise and feels \"sensory overload\" at times.     TEST RESULTS     Otoscopic Evaluation:  Physical exam to evaluate the outer ear  Right Ear: Clear ear canal.  Left Ear: Clear ear canal.    Tympanometry & Acoustic Reflexes:  Assesses the function of the middle ear and inner ear structures  Right Ear: Normal ear canal volume, peak pressure, and compliance, consistent with normal middle ear function (Type A). Ipsilateral Acoustic Reflexes were present at 500-4000 Hz.  Left Ear: Normal ear canal volume, peak pressure, and compliance, consistent with normal middle ear function (Type A). Ipsilateral Acoustic Reflexes were present at 500-4000 Hz.    Distortion Product Otoacoustic Emissions: Assesses the cochlear outer hair cell function (8618-2369 Hz frequency range)  DNT.    Pure Tone Audiometry:  Conventional Audiometry via [employer] headphones with " good reliability  Right Ear: Hearing sensitivity WNL.   Left Ear: Hearing sensitivity WNL.     Speech Audiometry:   Right Ear:  Speech Reception Threshold (SRT) was obtained at 10 dBHL. Speech reception threshold in agreement with pure tone average. Word Recognition scores were excellent (100%) in quiet when words were presented at 60 dBHL.   Left Ear:  Speech Reception Threshold (SRT) was obtained at 15 dBHL. Speech reception threshold in agreement with pure tone average. Word Recognition scores were excellent (100%) in quiet when words were presented at 60 dBHL.   Binaural Speech in Noise testing: QuickSIN revealed mild degree of SNR loss.       Testing and interpretation of results completed RAPHAEL Robins, CCC-A. It was my pleasure to evaluate this patient.       RAPHAEL Robins, CCC-A  Licensed Senior Audiologist      Degree of Hearing Sensitivity Decibel Range   Within Normal Limits (WNL) 0-25   Mild 26-40   Moderate 41-55   Moderately-Severe 56-70   Severe 71-90   Profound 91+      Key   CNT/DNT Could Not Test/Did Not Test   TM Tympanic Membrane   WNL Within Normal Limits   HA Hearing Aid   SNHL Sensorineural Hearing Loss   CHL Conductive Hearing Loss   NIHL Noise-Induced Hearing Loss   ECV Ear Canal Volume   RE/LE Right Ear/Left Ear        AUDIOGRAM

## 2024-01-17 ENCOUNTER — ANCILLARY PROCEDURE (OUTPATIENT)
Dept: RADIOLOGY | Facility: CLINIC | Age: 49
End: 2024-01-17
Payer: COMMERCIAL

## 2024-01-17 DIAGNOSIS — G89.29 CHRONIC ABDOMINAL PAIN: ICD-10-CM

## 2024-01-17 DIAGNOSIS — R10.9 CHRONIC ABDOMINAL PAIN: ICD-10-CM

## 2024-01-17 PROCEDURE — 76700 US EXAM ABDOM COMPLETE: CPT | Performed by: RADIOLOGY

## 2024-01-17 PROCEDURE — 76700 US EXAM ABDOM COMPLETE: CPT

## 2024-01-25 ENCOUNTER — OFFICE VISIT (OUTPATIENT)
Dept: CARDIOLOGY | Facility: CLINIC | Age: 49
End: 2024-01-25
Payer: COMMERCIAL

## 2024-01-25 VITALS
DIASTOLIC BLOOD PRESSURE: 88 MMHG | HEART RATE: 69 BPM | HEIGHT: 73 IN | BODY MASS INDEX: 24.78 KG/M2 | OXYGEN SATURATION: 97 % | SYSTOLIC BLOOD PRESSURE: 138 MMHG | WEIGHT: 187 LBS

## 2024-01-25 DIAGNOSIS — R06.09 DOE (DYSPNEA ON EXERTION): ICD-10-CM

## 2024-01-25 DIAGNOSIS — I10 PRIMARY HYPERTENSION: ICD-10-CM

## 2024-01-25 DIAGNOSIS — E78.00 PURE HYPERCHOLESTEROLEMIA: ICD-10-CM

## 2024-01-25 DIAGNOSIS — I50.22 CHRONIC SYSTOLIC HEART FAILURE (MULTI): Primary | ICD-10-CM

## 2024-01-25 DIAGNOSIS — Z95.810 STATUS POST INTERNAL CARDIAC DEFIBRILLATOR PROCEDURE: ICD-10-CM

## 2024-01-25 DIAGNOSIS — R00.0 SINUS TACHYCARDIA: ICD-10-CM

## 2024-01-25 DIAGNOSIS — I42.8 OTHER CARDIOMYOPATHY (MULTI): ICD-10-CM

## 2024-01-25 DIAGNOSIS — I47.29 VENTRICULAR TACHYCARDIA, MONOMORPHIC (MULTI): ICD-10-CM

## 2024-01-25 DIAGNOSIS — I47.29 NONSUSTAINED VENTRICULAR TACHYCARDIA (MULTI): ICD-10-CM

## 2024-01-25 PROCEDURE — 1036F TOBACCO NON-USER: CPT | Performed by: STUDENT IN AN ORGANIZED HEALTH CARE EDUCATION/TRAINING PROGRAM

## 2024-01-25 PROCEDURE — 99214 OFFICE O/P EST MOD 30 MIN: CPT | Performed by: STUDENT IN AN ORGANIZED HEALTH CARE EDUCATION/TRAINING PROGRAM

## 2024-01-25 PROCEDURE — 3075F SYST BP GE 130 - 139MM HG: CPT | Performed by: STUDENT IN AN ORGANIZED HEALTH CARE EDUCATION/TRAINING PROGRAM

## 2024-01-25 PROCEDURE — 3079F DIAST BP 80-89 MM HG: CPT | Performed by: STUDENT IN AN ORGANIZED HEALTH CARE EDUCATION/TRAINING PROGRAM

## 2024-01-25 NOTE — PROGRESS NOTES
Follow-up     HPI:    Stuart Merols is a 48 y.o. male with pertinent history of sleep apnea, hypertension, nonsustained ventricular tachycardia, moderate obstructive sleep apnea on sleep study performed January 2021, history of recovered non-ischemic dilated cardiomyopathy (25-30% in 2015; no obstructive CAD) s/p ICD, mildly reduced ejection fraction 45% on echo performed 1/2021, no clear ischemia on nuclear stress test performed January 2021, low normal ejection fraction 50 to 55% with left ventricular hypertrophy on echo performed 4/3/2023 presents for follow-up.     He is doing relatively well from a cardiac perspective.  He did try switching to losartan but did not notice a negative difference after being on Entresto and switch back to Entresto.  Otherwise he is doing well.  Unfortunately he continues to have very significant gastroenterology problems with fluctuations between diarrhea and constipation as well as significant abdominal discomfort.  He is following closely with gastroenterology and recently had an ultrasound that we reviewed.  No exacerbating or relieving factors.  Patient denies chest pain and angina.  Pt denies orthopnea, and paroxysmal nocturnal dyspnea.  Pt denies worsening lower extremity edema.  Pt denies palpitations or syncope.  No recent falls.  No fever or chills.  No cough.  No change in bowel or bladder habits.  No sick contacts.  No recent travel.    12 point review of systems including (Constitutional, Eyes, ENMT, Respiratory, Cardiac, Gastrointestinal, Neurological, Psychiatric, and Hematologic) was performed and is otherwise negative.    Past medical history:  As above.    Medications were reviewed.    Allergies were reviewed.    Social history:  Patient denies smoking, alcohol abuse, or illicit drug use.    Family history:  No sudden cardiac death or premature coronary artery disease.     Allergies reviewed: Atorvastatin and Penicillins     Medications reviewed:   Current  Outpatient Medications   Medication Instructions    amitriptyline (ELAVIL) 20 mg, oral, Nightly    cyanocobalamin, vitamin B-12, 1,000 mcg tablet, sublingual sublingual    Entresto 49-51 mg tablet 1 tablet, oral, 2 times daily    losartan (Cozaar) 50 mg tablet 1 tablet, oral, Daily    metoprolol succinate XL (TOPROL-XL) 50 mg, oral, Daily        Vitals reviewed: Visit Vitals  /88   Pulse 69     Physical Exam:   General:  Patient is awake, alert, and oriented.  Patient is in no acute distress.  HEENT:  Pupils equal and reactive.  Normocephalic.  Moist mucosa.    Neck:  No thyromegaly.  Normal Jugular Venous Pressure.  Cardiovascular:  Regular rate and rhythm.  Normal S1 and S2.  1/6 FREDDIE.  ICD in place  Pulmonary:  Clear to auscultation bilaterally.  Abdomen:  Soft. Non-tender.   Non-distended.  Positive bowel sounds.  Lower Extremities:  2+ pedal pulses. No LE edema.  Neurologic:  Cranial nerves intact.  No focal deficit.   Skin: Skin warm and dry, normal skin turgor.   Psychiatric: Normal affect.    Last Labs:  CBC -      Lab Results   Component Value Date    WBC 5.0 11/10/2022    HGB 14.8 11/10/2022    HCT 43.2 11/10/2022     11/10/2022        CMP-  Lab Results   Component Value Date    GLUCOSE 95 03/01/2023     03/01/2023    K 5.2 03/01/2023     03/01/2023    CO2 28 03/01/2023    ANIONGAP 12 03/01/2023    BUN 11 03/01/2023    CREATININE 0.94 03/01/2023    CALCIUM 10.1 03/01/2023    PROT 6.8 03/01/2023    ALBUMIN 4.5 03/01/2023    AST 14 03/01/2023    ALT 17 03/01/2023    ALKPHOS 86 03/01/2023    BILITOT 0.8 03/01/2023        LIPIDS-  Lab Results   Component Value Date    CHOL 243 (H) 02/27/2023    TRIG 264 (H) 02/27/2023    HDL 42.1 02/27/2023    CHHDL 5.8 (A) 02/27/2023    VLDL 53 (H) 02/27/2023        OTHERS-  Lab Results   Component Value Date    HGBA1C 4.9 11/10/2022    BNP 23 12/04/2020        I personally reviewed the patient's recent vitals, labs, medications, orders, EKGs,  pertinent cardiac imaging/ echocardiography and ischemic evaluations including stress testing/ cardiac catheterization.    Assessment and Plan:    Stuart Merlos is a 48 y.o. male with pertinent history of sleep apnea, hypertension, nonsustained ventricular tachycardia, moderate obstructive sleep apnea on sleep study performed January 2021, history of recovered non-ischemic dilated cardiomyopathy (25-30% in 2015; no obstructive CAD) s/p ICD, mildly reduced ejection fraction 45% on echo performed 1/2021, no clear ischemia on nuclear stress test performed January 2021, low normal ejection fraction 50 to 55% with left ventricular hypertrophy on echo performed 4/3/2023 presents for follow-up.  He is doing relatively well from a cardiac perspective.  He did try switching to losartan but did not notice a negative difference after being on Entresto and switch back to Entresto.  Otherwise he is doing well.  Unfortunately he continues to have very significant gastroenterology problems with fluctuations between diarrhea and constipation as well as significant abdominal discomfort.  He is following closely with gastroenterology and recently had an ultrasound that we reviewed.      Please follow-up with your gastroenterologist as scheduled.    Please continue current cardiac medications and Entresto 49-51 twice daily, metoprolol succinate 50 mg once daily.    Please followup with me in Cardiology clinic within the next 9 months.  Please return to clinic sooner or seek emergent care if your symptoms reoccur or worsen.    Thank you for allowing me to participate in their care.  Please feel free to call me with any further questions or concerns.        Fidencio Templeton MD, FACC, [facility]  Division of Cardiovascular Medicine  Medical Director, [facility] [place]  Clinical , [facility] [employer]  [email or web address]   Office:  [phone]

## 2024-01-25 NOTE — PATIENT INSTRUCTIONS
Please follow-up with your gastroenterologist as scheduled.    Please continue current cardiac medications and Entresto 49-51 twice daily, metoprolol succinate 50 mg once daily.    Please followup with me in Cardiology clinic within the next 9 months.  Please return to clinic sooner or seek emergent care if your symptoms reoccur or worsen.

## 2024-01-28 NOTE — PROGRESS NOTES
I saw and evaluated the patient. I personally obtained the key and critical portions of the history and physical exam or was physically present for key and critical portions performed by the trainee. I agree with the trainee's medical decision making as described in the trainee's note.    MD Deejay Luna MD PhD

## 2024-02-05 ENCOUNTER — LAB (OUTPATIENT)
Dept: LAB | Facility: LAB | Age: 49
End: 2024-02-05
Payer: COMMERCIAL

## 2024-02-05 DIAGNOSIS — R10.9 CHRONIC ABDOMINAL PAIN: ICD-10-CM

## 2024-02-05 DIAGNOSIS — G89.29 CHRONIC ABDOMINAL PAIN: ICD-10-CM

## 2024-02-05 PROCEDURE — 87449 NOS EACH ORGANISM AG IA: CPT

## 2024-02-08 LAB — H PYLORI AG STL QL IA: NEGATIVE

## 2024-02-15 ENCOUNTER — OFFICE VISIT (OUTPATIENT)
Dept: GASTROENTEROLOGY | Facility: HOSPITAL | Age: 49
End: 2024-02-15
Payer: COMMERCIAL

## 2024-02-15 VITALS
WEIGHT: 188 LBS | HEART RATE: 80 BPM | TEMPERATURE: 97.6 F | HEIGHT: 73 IN | BODY MASS INDEX: 24.92 KG/M2 | SYSTOLIC BLOOD PRESSURE: 115 MMHG | OXYGEN SATURATION: 95 % | DIASTOLIC BLOOD PRESSURE: 82 MMHG

## 2024-02-15 DIAGNOSIS — M25.551 RIGHT HIP PAIN: Primary | ICD-10-CM

## 2024-02-15 PROCEDURE — 1036F TOBACCO NON-USER: CPT | Performed by: STUDENT IN AN ORGANIZED HEALTH CARE EDUCATION/TRAINING PROGRAM

## 2024-02-15 PROCEDURE — 3074F SYST BP LT 130 MM HG: CPT | Performed by: STUDENT IN AN ORGANIZED HEALTH CARE EDUCATION/TRAINING PROGRAM

## 2024-02-15 PROCEDURE — 99215 OFFICE O/P EST HI 40 MIN: CPT | Performed by: STUDENT IN AN ORGANIZED HEALTH CARE EDUCATION/TRAINING PROGRAM

## 2024-02-15 PROCEDURE — 3079F DIAST BP 80-89 MM HG: CPT | Performed by: STUDENT IN AN ORGANIZED HEALTH CARE EDUCATION/TRAINING PROGRAM

## 2024-02-15 SDOH — ECONOMIC STABILITY: FOOD INSECURITY: WITHIN THE PAST 12 MONTHS, THE FOOD YOU BOUGHT JUST DIDN'T LAST AND YOU DIDN'T HAVE MONEY TO GET MORE.: NEVER TRUE

## 2024-02-15 SDOH — ECONOMIC STABILITY: FOOD INSECURITY: WITHIN THE PAST 12 MONTHS, YOU WORRIED THAT YOUR FOOD WOULD RUN OUT BEFORE YOU GOT MONEY TO BUY MORE.: NEVER TRUE

## 2024-02-15 ASSESSMENT — LIFESTYLE VARIABLES
SKIP TO QUESTIONS 9-10: 1
HOW OFTEN DO YOU HAVE SIX OR MORE DRINKS ON ONE OCCASION: NEVER
AUDIT-C TOTAL SCORE: 2
HOW MANY STANDARD DRINKS CONTAINING ALCOHOL DO YOU HAVE ON A TYPICAL DAY: 1 OR 2
HOW OFTEN DO YOU HAVE A DRINK CONTAINING ALCOHOL: 2-4 TIMES A MONTH

## 2024-02-15 ASSESSMENT — ENCOUNTER SYMPTOMS
DEPRESSION: 0
LOSS OF SENSATION IN FEET: 1
OCCASIONAL FEELINGS OF UNSTEADINESS: 1

## 2024-02-15 ASSESSMENT — COLUMBIA-SUICIDE SEVERITY RATING SCALE - C-SSRS
6. HAVE YOU EVER DONE ANYTHING, STARTED TO DO ANYTHING, OR PREPARED TO DO ANYTHING TO END YOUR LIFE?: NO
2. HAVE YOU ACTUALLY HAD ANY THOUGHTS OF KILLING YOURSELF?: NO
1. IN THE PAST MONTH, HAVE YOU WISHED YOU WERE DEAD OR WISHED YOU COULD GO TO SLEEP AND NOT WAKE UP?: NO

## 2024-02-15 ASSESSMENT — PATIENT HEALTH QUESTIONNAIRE - PHQ9
1. LITTLE INTEREST OR PLEASURE IN DOING THINGS: NOT AT ALL
SUM OF ALL RESPONSES TO PHQ9 QUESTIONS 1 & 2: 0
2. FEELING DOWN, DEPRESSED OR HOPELESS: NOT AT ALL

## 2024-02-15 ASSESSMENT — PAIN SCALES - GENERAL: PAINLEVEL: 6

## 2024-02-15 NOTE — PATIENT INSTRUCTIONS
#Hip   1) I will talk to radiologist to determine the correct study  2) Referral to physical therapy   3) speak to cardiologist regarding the MR study and your cardiac device     #Abdominal   1) Follow up with referral to psychiatry for medication management   2) set up psychotherapy
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
## 2024-02-21 ENCOUNTER — HOSPITAL ENCOUNTER (OUTPATIENT)
Dept: CARDIOLOGY | Facility: CLINIC | Age: 49
Discharge: HOME | End: 2024-02-21
Payer: COMMERCIAL

## 2024-02-21 DIAGNOSIS — I42.9 CARDIOMYOPATHY, UNSPECIFIED TYPE (MULTI): ICD-10-CM

## 2024-02-21 DIAGNOSIS — Z95.810 PRESENCE OF AUTOMATIC CARDIOVERTER/DEFIBRILLATOR (AICD): ICD-10-CM

## 2024-02-21 PROCEDURE — 93296 REM INTERROG EVL PM/IDS: CPT

## 2024-02-21 PROCEDURE — 93295 DEV INTERROG REMOTE 1/2/MLT: CPT | Performed by: INTERNAL MEDICINE

## 2024-02-27 ENCOUNTER — TELEPHONE (OUTPATIENT)
Dept: GASTROENTEROLOGY | Facility: HOSPITAL | Age: 49
End: 2024-02-27
Payer: COMMERCIAL

## 2024-02-27 DIAGNOSIS — S39.011D STRAIN OF ABDOMINAL MUSCLE, SUBSEQUENT ENCOUNTER: Primary | ICD-10-CM

## 2024-02-27 DIAGNOSIS — R10.31 RIGHT LOWER QUADRANT ABDOMINAL PAIN: ICD-10-CM

## 2024-02-27 NOTE — TELEPHONE ENCOUNTER
Patient is ordered for MR Pelvis - Athletic pubalgia/ sports hernia protocol.      Efrain Grullon MD, PhD  Gastroenterology Fellow, PGY-5  [facility]   Division of Gastroenterology and Liver Disease

## 2024-03-27 ENCOUNTER — HOSPITAL ENCOUNTER (OUTPATIENT)
Dept: RADIOLOGY | Facility: HOSPITAL | Age: 49
Discharge: HOME | End: 2024-03-27
Payer: COMMERCIAL

## 2024-03-27 ENCOUNTER — HOSPITAL ENCOUNTER (OUTPATIENT)
Dept: CARDIOLOGY | Facility: HOSPITAL | Age: 49
Discharge: HOME | End: 2024-03-27
Payer: COMMERCIAL

## 2024-03-27 VITALS — OXYGEN SATURATION: 97 % | DIASTOLIC BLOOD PRESSURE: 70 MMHG | HEART RATE: 87 BPM | SYSTOLIC BLOOD PRESSURE: 124 MMHG

## 2024-03-27 DIAGNOSIS — S39.011D STRAIN OF ABDOMINAL MUSCLE, SUBSEQUENT ENCOUNTER: ICD-10-CM

## 2024-03-27 DIAGNOSIS — R10.31 RIGHT LOWER QUADRANT ABDOMINAL PAIN: ICD-10-CM

## 2024-03-27 DIAGNOSIS — Z95.0 PACEMAKER: ICD-10-CM

## 2024-03-27 PROCEDURE — 93287 PERI-PX DEVICE EVAL & PRGR: CPT | Performed by: INTERNAL MEDICINE

## 2024-03-27 PROCEDURE — 72195 MRI PELVIS W/O DYE: CPT | Performed by: RADIOLOGY

## 2024-03-27 PROCEDURE — 72195 MRI PELVIS W/O DYE: CPT

## 2024-03-27 PROCEDURE — 93287 PERI-PX DEVICE EVAL & PRGR: CPT

## 2024-03-27 NOTE — NURSING NOTE
Patient completed MRI and the device clinical nurse arrived checked patient's pacemaker/ICD and turned pacing back on, per device nurse. MIGUELITO

## 2024-03-27 NOTE — NURSING NOTE
"Patient is alert and able to make needs known; has both pacemaker/ICD and the device clinical nurse has checked both pacemaker/ICD, interrogated them both and turned both pacing off, per device nurse. Patient voiced no c/o pain or discomfort and no signs of distress noted. When asked patient if he's ok, patient said \"Yes, I'm ok\". JWH  "

## 2024-05-15 ENCOUNTER — EVALUATION (OUTPATIENT)
Dept: PHYSICAL THERAPY | Facility: CLINIC | Age: 49
End: 2024-05-15
Payer: COMMERCIAL

## 2024-05-15 DIAGNOSIS — M25.551 RIGHT HIP PAIN: ICD-10-CM

## 2024-05-15 PROCEDURE — 97161 PT EVAL LOW COMPLEX 20 MIN: CPT | Mod: GP

## 2024-05-15 PROCEDURE — 97140 MANUAL THERAPY 1/> REGIONS: CPT | Mod: GP
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
## 2024-05-17 ENCOUNTER — HOSPITAL ENCOUNTER (OUTPATIENT)
Dept: CARDIOLOGY | Facility: CLINIC | Age: 49
Discharge: HOME | End: 2024-05-17
Payer: COMMERCIAL

## 2024-05-17 DIAGNOSIS — Z95.810 PRESENCE OF AUTOMATIC CARDIOVERTER/DEFIBRILLATOR (AICD): ICD-10-CM

## 2024-05-17 DIAGNOSIS — I42.9 CARDIOMYOPATHY, UNSPECIFIED TYPE (MULTI): ICD-10-CM

## 2024-08-01 ENCOUNTER — DOCUMENTATION (OUTPATIENT)
Dept: PHYSICAL THERAPY | Facility: CLINIC | Age: 49
End: 2024-08-01
Payer: COMMERCIAL

## 2024-08-01 NOTE — PROGRESS NOTES
Physical Therapy    Discharge Summary    Name: Stuart Merlos  MRN: [id number]  : 1975  Date: 24    Discharge Summary: PT    Discharge Information: Date of evaluation per chart     Therapy Summary: eval and discharge     Discharge Status: discharge      Rehab Discharge Reason: Other discharge

## 2024-10-18 ENCOUNTER — HOSPITAL ENCOUNTER (OUTPATIENT)
Dept: CARDIOLOGY | Facility: CLINIC | Age: 49
Discharge: HOME | End: 2024-10-18
Payer: COMMERCIAL

## 2024-10-18 DIAGNOSIS — I42.9 CARDIOMYOPATHY, UNSPECIFIED TYPE (MULTI): ICD-10-CM

## 2024-10-18 DIAGNOSIS — Z95.810 PRESENCE OF AUTOMATIC CARDIOVERTER/DEFIBRILLATOR (AICD): ICD-10-CM

## 2024-10-18 PROCEDURE — 93295 DEV INTERROG REMOTE 1/2/MLT: CPT | Performed by: INTERNAL MEDICINE

## 2024-10-18 PROCEDURE — 93296 REM INTERROG EVL PM/IDS: CPT

## 2024-10-23 ENCOUNTER — OFFICE VISIT (OUTPATIENT)
Dept: CARDIOLOGY | Facility: CLINIC | Age: 49
End: 2024-10-23
Payer: COMMERCIAL

## 2024-10-23 VITALS
WEIGHT: 180 LBS | HEART RATE: 68 BPM | BODY MASS INDEX: 24.38 KG/M2 | SYSTOLIC BLOOD PRESSURE: 130 MMHG | HEIGHT: 72 IN | DIASTOLIC BLOOD PRESSURE: 82 MMHG | OXYGEN SATURATION: 99 %

## 2024-10-23 DIAGNOSIS — R07.89 ATYPICAL CHEST PAIN: Primary | ICD-10-CM

## 2024-10-23 DIAGNOSIS — I50.22 CHRONIC SYSTOLIC HEART FAILURE: ICD-10-CM

## 2024-10-23 DIAGNOSIS — I42.9 CARDIOMYOPATHY, UNSPECIFIED TYPE (MULTI): ICD-10-CM

## 2024-10-23 DIAGNOSIS — Z95.0 PACEMAKER: ICD-10-CM

## 2024-10-23 DIAGNOSIS — Z95.810 STATUS POST INTERNAL CARDIAC DEFIBRILLATOR PROCEDURE: ICD-10-CM

## 2024-10-23 DIAGNOSIS — I47.29 VENTRICULAR TACHYCARDIA, MONOMORPHIC (MULTI): ICD-10-CM

## 2024-10-23 DIAGNOSIS — R06.09 DOE (DYSPNEA ON EXERTION): ICD-10-CM

## 2024-10-23 DIAGNOSIS — S39.011D STRAIN OF ABDOMINAL MUSCLE, SUBSEQUENT ENCOUNTER: ICD-10-CM

## 2024-10-23 DIAGNOSIS — I10 PRIMARY HYPERTENSION: ICD-10-CM

## 2024-10-23 DIAGNOSIS — Z95.810 PRESENCE OF AUTOMATIC CARDIOVERTER/DEFIBRILLATOR (AICD): ICD-10-CM

## 2024-10-23 DIAGNOSIS — R53.83 OTHER FATIGUE: ICD-10-CM

## 2024-10-23 DIAGNOSIS — R00.0 SINUS TACHYCARDIA: ICD-10-CM

## 2024-10-23 DIAGNOSIS — R63.4 WEIGHT LOSS: ICD-10-CM

## 2024-10-23 DIAGNOSIS — I42.8 OTHER CARDIOMYOPATHY: ICD-10-CM

## 2024-10-23 DIAGNOSIS — I47.29 NONSUSTAINED VENTRICULAR TACHYCARDIA (MULTI): ICD-10-CM

## 2024-10-23 DIAGNOSIS — R14.0 ABDOMINAL BLOATING: ICD-10-CM

## 2024-10-23 DIAGNOSIS — E78.00 PURE HYPERCHOLESTEROLEMIA: ICD-10-CM

## 2024-10-23 DIAGNOSIS — R10.84 GENERALIZED ABDOMINAL PAIN: ICD-10-CM

## 2024-10-23 PROCEDURE — 3075F SYST BP GE 130 - 139MM HG: CPT | Performed by: STUDENT IN AN ORGANIZED HEALTH CARE EDUCATION/TRAINING PROGRAM

## 2024-10-23 PROCEDURE — 3079F DIAST BP 80-89 MM HG: CPT | Performed by: STUDENT IN AN ORGANIZED HEALTH CARE EDUCATION/TRAINING PROGRAM

## 2024-10-23 PROCEDURE — 99215 OFFICE O/P EST HI 40 MIN: CPT | Performed by: STUDENT IN AN ORGANIZED HEALTH CARE EDUCATION/TRAINING PROGRAM

## 2024-10-23 PROCEDURE — 1036F TOBACCO NON-USER: CPT | Performed by: STUDENT IN AN ORGANIZED HEALTH CARE EDUCATION/TRAINING PROGRAM

## 2024-10-23 PROCEDURE — 3008F BODY MASS INDEX DOCD: CPT | Performed by: STUDENT IN AN ORGANIZED HEALTH CARE EDUCATION/TRAINING PROGRAM

## 2024-10-23 ASSESSMENT — ENCOUNTER SYMPTOMS
DEPRESSION: 0
OCCASIONAL FEELINGS OF UNSTEADINESS: 0
LOSS OF SENSATION IN FEET: 0

## 2024-10-23 ASSESSMENT — PATIENT HEALTH QUESTIONNAIRE - PHQ9
SUM OF ALL RESPONSES TO PHQ9 QUESTIONS 1 AND 2: 0
2. FEELING DOWN, DEPRESSED OR HOPELESS: NOT AT ALL
1. LITTLE INTEREST OR PLEASURE IN DOING THINGS: NOT AT ALL

## 2024-10-23 ASSESSMENT — PAIN SCALES - GENERAL: PAINLEVEL_OUTOF10: 0-NO PAIN

## 2024-10-23 NOTE — PROGRESS NOTES
Follow-up visit    HPI:    Stuart Merlos is a 49 y.o. male with pertinent history of sleep apnea, hypertension, nonsustained ventricular tachycardia, moderate obstructive sleep apnea on sleep study performed January 2021, history of recovered non-ischemic dilated cardiomyopathy (25-30% in 2015; no obstructive CAD) s/p ICD, mildly reduced ejection fraction 45% on echo performed 1/2021, no clear ischemia on nuclear stress test performed January 2021, low normal ejection fraction 50 to 55% with left ventricular hypertrophy on echo performed 4/3/2023 presents for follow-up.     nfortunately, he is having severe ongoing abdominal pain.  He is very concerned about severe weight loss is unintentional.  He continues to have problems with blood in his stool and abnormal stools.  He has very little appetite.  We did review his last GI note.  He does note severe fatigue.  He does note some occasional chest discomfort that is relatively stable over the last few months.  Dyspnea on exertion is stable.  No exacerbating or relieving factors.  Patient denies chest pain and angina.  Pt denies orthopnea, and paroxysmal nocturnal dyspnea.  Pt denies worsening lower extremity edema.  Pt denies palpitations or syncope.  No recent falls.  No fever or chills.  No cough.  No change in bowel or bladder habits.  No sick contacts.  No recent travel.    12 point review of systems including (Constitutional, Eyes, ENMT, Respiratory, Cardiac, Gastrointestinal, Neurological, Psychiatric, and Hematologic) was performed and is otherwise negative.    Past medical history:  As above.    Medications were reviewed.    Allergies were reviewed.    Social history:  Patient denies smoking, alcohol abuse, or illicit drug use.    Family history:  No sudden cardiac death or premature coronary artery disease.     Allergies reviewed: Atorvastatin and Penicillins     Medications reviewed:   Current Outpatient Medications   Medication Instructions     amitriptyline (ELAVIL) 20 mg, oral, Nightly    cyanocobalamin, vitamin B-12, 1,000 mcg tablet, sublingual Place under the tongue.    Entresto 49-51 mg tablet 1 tablet, oral, 2 times daily    losartan (Cozaar) 50 mg tablet 1 tablet, Daily    metoprolol succinate XL (TOPROL-XL) 50 mg, oral, Daily        Vitals reviewed: Visit Vitals  /82 (BP Location: Left arm, Patient Position: Sitting)   Pulse 68     Physical Exam:   General:  Patient is awake, alert, and oriented.  Patient is in no acute distress.  HEENT:  Pupils equal and reactive.  Normocephalic.  Moist mucosa.    Neck:  No thyromegaly.  Normal Jugular Venous Pressure.  Cardiovascular:  Regular rate and rhythm.  Normal S1 and S2.  1/6 FREDDIE.  ICD in place  Pulmonary:  Clear to auscultation bilaterally.  Abdomen:  Soft. Non-tender.   Non-distended.  Positive bowel sounds.  Lower Extremities:  2+ pedal pulses. No LE edema.  Neurologic:  Cranial nerves intact.  No focal deficit.   Skin: Skin warm and dry, normal skin turgor.   Psychiatric: Normal affect.    Last Labs:  CBC -      Lab Results   Component Value Date    WBC 5.0 11/10/2022    HGB 14.8 11/10/2022    HCT 43.2 11/10/2022     11/10/2022        CMP-  Lab Results   Component Value Date    GLUCOSE 95 03/01/2023     03/01/2023    K 5.2 03/01/2023     03/01/2023    CO2 28 03/01/2023    ANIONGAP 12 03/01/2023    BUN 11 03/01/2023    CREATININE 0.94 03/01/2023    CALCIUM 10.1 03/01/2023    PROT 6.8 03/01/2023    ALBUMIN 4.5 03/01/2023    AST 14 03/01/2023    ALT 17 03/01/2023    ALKPHOS 86 03/01/2023    BILITOT 0.8 03/01/2023        LIPIDS-  Lab Results   Component Value Date    CHOL 243 (H) 02/27/2023    TRIG 264 (H) 02/27/2023    HDL 42.1 02/27/2023    CHHDL 5.8 (A) 02/27/2023    VLDL 53 (H) 02/27/2023        OTHERS-  Lab Results   Component Value Date    HGBA1C 4.9 11/10/2022    BNP 23 12/04/2020        I personally reviewed the patient's recent vitals, labs, medications, orders, EKGs,  pertinent cardiac imaging/ echocardiography and ischemic evaluations including stress testing/ cardiac catheterization.    Assessment and Plan:    Stuart Merlos is a 49 y.o. male with pertinent history of sleep apnea, hypertension, nonsustained ventricular tachycardia, moderate obstructive sleep apnea on sleep study performed January 2021, history of recovered non-ischemic dilated cardiomyopathy (25-30% in 2015; no obstructive CAD) s/p ICD, mildly reduced ejection fraction 45% on echo performed 1/2021, no clear ischemia on nuclear stress test performed January 2021, low normal ejection fraction 50 to 55% with left ventricular hypertrophy on echo performed 4/3/2023 presents for follow-up.  Unfortunately, he is having severe ongoing abdominal pain.  He is very concerned about severe weight loss is unintentional.  He continues to have problems with blood in his stool and abnormal stools.  He has very little appetite.  We did review his last GI note.  He does note severe fatigue.  He does note some occasional chest discomfort that is relatively stable over the last few months.  Dyspnea on exertion is stable.      We will arrange for follow-up with gastroenterology.  I have also placed referral to check in with endocrinology.    We will obtain a transthoracic echocardiogram for structural evaluation including ejection fraction, assessment of regional wall motion abnormalities or valvular disease, and further evaluation of hemodynamics.    Given the patient's risk factors and symptoms, we will obtain a stress test for further ischemic evaluation.    Please continue current cardiac medications and Entresto 49-51 twice daily, metoprolol succinate 50 mg once daily.    Please followup with me in Cardiology clinic within the next 6 months.  Please return to clinic sooner or seek emergent care if your symptoms reoccur or worsen.    Thank you for allowing me to participate in their care.  Please feel free to call me with any  further questions or concerns.        Fidencio Templeton MD, FACC, YANNI GONZALEZ  Division of Cardiovascular Medicine  Medical Director, [facility] [place]  Clinical , [facility] [employer]  [email or web address]   Office:  [phone]

## 2024-10-23 NOTE — PATIENT INSTRUCTIONS
We will arrange for follow-up with gastroenterology.  I have also placed referral to check in with endocrinology.    We will obtain a transthoracic echocardiogram for structural evaluation including ejection fraction, assessment of regional wall motion abnormalities or valvular disease, and further evaluation of hemodynamics.    Given the patient's risk factors and symptoms, we will obtain a stress test for further ischemic evaluation.    Please continue current cardiac medications and Entresto 49-51 twice daily, metoprolol succinate 50 mg once daily.    Please followup with me in Cardiology clinic within the next 6 months.  Please return to clinic sooner or seek emergent care if your symptoms reoccur or worsen.

## 2024-11-13 ENCOUNTER — ANCILLARY PROCEDURE (OUTPATIENT)
Dept: CARDIOLOGY | Facility: CLINIC | Age: 49
End: 2024-11-13
Payer: COMMERCIAL

## 2024-11-13 DIAGNOSIS — R10.84 GENERALIZED ABDOMINAL PAIN: ICD-10-CM

## 2024-11-13 DIAGNOSIS — R63.4 WEIGHT LOSS: ICD-10-CM

## 2024-11-13 DIAGNOSIS — I42.9 CARDIOMYOPATHY, UNSPECIFIED TYPE (MULTI): ICD-10-CM

## 2024-11-13 DIAGNOSIS — R00.0 SINUS TACHYCARDIA: ICD-10-CM

## 2024-11-13 DIAGNOSIS — E78.00 PURE HYPERCHOLESTEROLEMIA: ICD-10-CM

## 2024-11-13 DIAGNOSIS — I47.29 NONSUSTAINED VENTRICULAR TACHYCARDIA (MULTI): ICD-10-CM

## 2024-11-13 DIAGNOSIS — R53.83 OTHER FATIGUE: ICD-10-CM

## 2024-11-13 DIAGNOSIS — Z95.0 PACEMAKER: ICD-10-CM

## 2024-11-13 DIAGNOSIS — Z95.810 STATUS POST INTERNAL CARDIAC DEFIBRILLATOR PROCEDURE: ICD-10-CM

## 2024-11-13 DIAGNOSIS — I10 PRIMARY HYPERTENSION: ICD-10-CM

## 2024-11-13 DIAGNOSIS — I47.29 VENTRICULAR TACHYCARDIA, MONOMORPHIC (MULTI): ICD-10-CM

## 2024-11-13 DIAGNOSIS — R06.09 DOE (DYSPNEA ON EXERTION): ICD-10-CM

## 2024-11-13 DIAGNOSIS — S39.011D STRAIN OF ABDOMINAL MUSCLE, SUBSEQUENT ENCOUNTER: ICD-10-CM

## 2024-11-13 DIAGNOSIS — Z95.810 PRESENCE OF AUTOMATIC CARDIOVERTER/DEFIBRILLATOR (AICD): ICD-10-CM

## 2024-11-13 DIAGNOSIS — I50.22 CHRONIC SYSTOLIC HEART FAILURE: ICD-10-CM

## 2024-11-13 DIAGNOSIS — R14.0 ABDOMINAL BLOATING: ICD-10-CM

## 2024-11-13 DIAGNOSIS — I42.8 OTHER CARDIOMYOPATHY: ICD-10-CM

## 2024-11-13 DIAGNOSIS — R07.89 ATYPICAL CHEST PAIN: ICD-10-CM

## 2024-11-13 DIAGNOSIS — R07.9 CHEST PAIN, UNSPECIFIED: ICD-10-CM

## 2024-11-13 LAB
AORTIC VALVE PEAK VELOCITY: 1.05 M/S
AV PEAK GRADIENT: 4 MMHG
AVA (PEAK VEL): 3.7 CM2
EJECTION FRACTION APICAL 4 CHAMBER: 54.5
EJECTION FRACTION: 60 %
LEFT ATRIUM VOLUME AREA LENGTH INDEX BSA: 27.9 ML/M2
LEFT VENTRICLE INTERNAL DIMENSION DIASTOLE: 5.22 CM (ref 3.5–6)
LEFT VENTRICULAR OUTFLOW TRACT DIAMETER: 2.42 CM
MITRAL VALVE E/A RATIO: 0.7
RIGHT VENTRICLE FREE WALL PEAK S': 9 CM/S
TRICUSPID ANNULAR PLANE SYSTOLIC EXCURSION: 1.6 CM

## 2024-11-13 PROCEDURE — 93306 TTE W/DOPPLER COMPLETE: CPT

## 2024-11-13 PROCEDURE — 93306 TTE W/DOPPLER COMPLETE: CPT | Performed by: STUDENT IN AN ORGANIZED HEALTH CARE EDUCATION/TRAINING PROGRAM

## 2024-11-18 DIAGNOSIS — I42.9 CARDIOMYOPATHY, UNSPECIFIED TYPE (MULTI): ICD-10-CM

## 2024-11-19 RX ORDER — METOPROLOL SUCCINATE 50 MG/1
50 TABLET, EXTENDED RELEASE ORAL DAILY
Qty: 90 TABLET | Refills: 3 | Status: SHIPPED | OUTPATIENT
Start: 2024-11-19

## 2025-01-16 ENCOUNTER — HOSPITAL ENCOUNTER (OUTPATIENT)
Dept: CARDIOLOGY | Facility: CLINIC | Age: 50
Discharge: HOME | End: 2025-01-16
Payer: COMMERCIAL

## 2025-01-16 DIAGNOSIS — I42.0 DILATED CARDIOMYOPATHY (MULTI): ICD-10-CM

## 2025-01-16 DIAGNOSIS — Z95.810 PRESENCE OF AUTOMATIC (IMPLANTABLE) CARDIAC DEFIBRILLATOR: ICD-10-CM

## 2025-02-04 ENCOUNTER — HOSPITAL ENCOUNTER (OUTPATIENT)
Dept: CARDIOLOGY | Facility: CLINIC | Age: 50
Discharge: HOME | End: 2025-02-04
Payer: COMMERCIAL

## 2025-02-04 DIAGNOSIS — I42.9 CARDIOMYOPATHY, UNSPECIFIED TYPE (MULTI): ICD-10-CM

## 2025-02-04 DIAGNOSIS — Z95.810 PRESENCE OF AUTOMATIC CARDIOVERTER/DEFIBRILLATOR (AICD): ICD-10-CM

## 2025-02-04 PROCEDURE — 93296 REM INTERROG EVL PM/IDS: CPT

## 2025-02-04 PROCEDURE — 93295 DEV INTERROG REMOTE 1/2/MLT: CPT | Performed by: INTERNAL MEDICINE

## 2025-04-23 ENCOUNTER — OFFICE VISIT (OUTPATIENT)
Dept: CARDIOLOGY | Facility: CLINIC | Age: 50
End: 2025-04-23
Payer: COMMERCIAL

## 2025-04-23 VITALS
WEIGHT: 186 LBS | DIASTOLIC BLOOD PRESSURE: 82 MMHG | HEIGHT: 73 IN | HEART RATE: 90 BPM | SYSTOLIC BLOOD PRESSURE: 142 MMHG | BODY MASS INDEX: 24.65 KG/M2 | OXYGEN SATURATION: 98 %

## 2025-04-23 DIAGNOSIS — Z95.810 PRESENCE OF AUTOMATIC (IMPLANTABLE) CARDIAC DEFIBRILLATOR: ICD-10-CM

## 2025-04-23 DIAGNOSIS — Z95.810 PRESENCE OF AUTOMATIC CARDIOVERTER/DEFIBRILLATOR (AICD): ICD-10-CM

## 2025-04-23 DIAGNOSIS — I42.8 OTHER CARDIOMYOPATHY: ICD-10-CM

## 2025-04-23 DIAGNOSIS — I42.9 CARDIOMYOPATHY, UNSPECIFIED TYPE (MULTI): Primary | ICD-10-CM

## 2025-04-23 DIAGNOSIS — R07.89 ATYPICAL CHEST PAIN: ICD-10-CM

## 2025-04-23 DIAGNOSIS — Z95.0 PACEMAKER: ICD-10-CM

## 2025-04-23 DIAGNOSIS — R68.89 HEAT INTOLERANCE: ICD-10-CM

## 2025-04-23 DIAGNOSIS — I42.0 DILATED CARDIOMYOPATHY (MULTI): ICD-10-CM

## 2025-04-23 DIAGNOSIS — I50.22 CHRONIC SYSTOLIC HEART FAILURE: ICD-10-CM

## 2025-04-23 DIAGNOSIS — R53.83 OTHER FATIGUE: ICD-10-CM

## 2025-04-23 DIAGNOSIS — E78.00 PURE HYPERCHOLESTEROLEMIA: ICD-10-CM

## 2025-04-23 DIAGNOSIS — R07.81 RIB PAIN: ICD-10-CM

## 2025-04-23 DIAGNOSIS — R63.4 ABNORMAL WEIGHT LOSS: ICD-10-CM

## 2025-04-23 PROCEDURE — 99215 OFFICE O/P EST HI 40 MIN: CPT | Performed by: STUDENT IN AN ORGANIZED HEALTH CARE EDUCATION/TRAINING PROGRAM

## 2025-04-23 PROCEDURE — 3008F BODY MASS INDEX DOCD: CPT | Performed by: STUDENT IN AN ORGANIZED HEALTH CARE EDUCATION/TRAINING PROGRAM

## 2025-04-23 PROCEDURE — 3079F DIAST BP 80-89 MM HG: CPT | Performed by: STUDENT IN AN ORGANIZED HEALTH CARE EDUCATION/TRAINING PROGRAM

## 2025-04-23 PROCEDURE — 3077F SYST BP >= 140 MM HG: CPT | Performed by: STUDENT IN AN ORGANIZED HEALTH CARE EDUCATION/TRAINING PROGRAM

## 2025-04-23 PROCEDURE — 1036F TOBACCO NON-USER: CPT | Performed by: STUDENT IN AN ORGANIZED HEALTH CARE EDUCATION/TRAINING PROGRAM

## 2025-04-23 RX ORDER — LOSARTAN POTASSIUM 25 MG/1
25 TABLET ORAL DAILY
Qty: 30 TABLET | Refills: 11 | Status: SHIPPED | OUTPATIENT
Start: 2025-04-23 | End: 2026-04-23

## 2025-04-23 NOTE — PATIENT INSTRUCTIONS
We will arrange for follow-up with endocrinology as well as rheumatology.    We discussed restarting losartan at a lower dose of 25 mg once daily.  This was sent to her pharmacy.    Please followup with me in Cardiology clinic within the next 9 months.  Please return to clinic sooner or seek emergent care if your symptoms reoccur or worsen.

## 2025-04-23 NOTE — PROGRESS NOTES
Follow-up visit    HPI:    Stuart Merlos is a 50 y.o. male with pertinent history of sleep apnea, hypertension, nonsustained ventricular tachycardia, moderate obstructive sleep apnea on sleep study performed January 2021, history of recovered non-ischemic dilated cardiomyopathy (25-30% in 2015; no obstructive CAD) s/p ICD, mildly reduced ejection fraction 45% on echo performed 1/2021, no clear ischemia on nuclear stress test performed January 2021, low normal ejection fraction 50 to 55% with left ventricular hypertrophy on echo performed 4/3/2023 presents for follow-up.     From a cardiac perspective he is relatively stable.  He does note that he stopped taking all of his cardiac meds over the last year or so.  He has numerous problems many of them are gastrointestinal in nature.  He is undergoing evaluation for chronic pain.  He states that severe pain throughout the right side of his body is ruining his life.  He also notes severe heat and cold intolerance preventing him from doing much of his normal activities.  He had questions about chronic Lyme disease.  We did review his recent echocardiogram and the natural history of cardiomyopathy.  Dyspnea on exertion is stable.  No exacerbating or relieving factors.  Patient denies chest pain and angina.  Pt denies orthopnea, and paroxysmal nocturnal dyspnea.  Pt denies worsening lower extremity edema.  Pt denies palpitations or syncope.  No recent falls.  No fever or chills.  No cough.  No change in bowel or bladder habits.  No sick contacts.  No recent travel.    12 point review of systems including (Constitutional, Eyes, ENMT, Respiratory, Cardiac, Gastrointestinal, Neurological, Psychiatric, and Hematologic) was performed and is otherwise negative.    Past medical history:  As above.    Medications were reviewed.    Allergies were reviewed.    Social history:  Patient denies smoking, alcohol abuse, or illicit drug use.    Family history:  No sudden cardiac death or  premature coronary artery disease.     Allergies reviewed: Atorvastatin and Penicillins     Medications reviewed:   Current Outpatient Medications   Medication Instructions    amitriptyline (ELAVIL) 20 mg, oral, Nightly    cyanocobalamin, vitamin B-12, 1,000 mcg tablet, sublingual Place under the tongue.    Entresto 49-51 mg tablet 1 tablet, oral, 2 times daily    losartan (Cozaar) 50 mg tablet 1 tablet, Daily    metoprolol succinate XL (TOPROL-XL) 50 mg, oral, Daily        Vitals reviewed: Visit Vitals  /82   Pulse 90     Physical Exam:   General:  Patient is awake, alert, and oriented.  Patient is in no acute distress.  HEENT:  Pupils equal and reactive.  Normocephalic.  Moist mucosa.    Neck:  No thyromegaly.  Normal Jugular Venous Pressure.  Cardiovascular:  Regular rate and rhythm.  Normal S1 and S2.  1/6 FREDDIE.  ICD in place  Pulmonary:  Clear to auscultation bilaterally.  Abdomen:  Soft. Non-tender.   Non-distended.  Positive bowel sounds.  Lower Extremities:  2+ pedal pulses. No LE edema.  Neurologic:  Cranial nerves intact.  No focal deficit.   Skin: Skin warm and dry, normal skin turgor.   Psychiatric: Normal affect.    Last Labs:  CBC -      Lab Results   Component Value Date    WBC 5.0 11/10/2022    HGB 14.8 11/10/2022    HCT 43.2 11/10/2022     11/10/2022        CMP-  Lab Results   Component Value Date    GLUCOSE 95 03/01/2023     03/01/2023    K 5.2 03/01/2023     03/01/2023    CO2 28 03/01/2023    ANIONGAP 12 03/01/2023    BUN 11 03/01/2023    CREATININE 0.94 03/01/2023    CALCIUM 10.1 03/01/2023    PROT 6.8 03/01/2023    ALBUMIN 4.5 03/01/2023    AST 14 03/01/2023    ALT 17 03/01/2023    ALKPHOS 86 03/01/2023    BILITOT 0.8 03/01/2023        LIPIDS-  Lab Results   Component Value Date    CHOL 243 (H) 02/27/2023    TRIG 264 (H) 02/27/2023    HDL 42.1 02/27/2023    CHHDL 5.8 (A) 02/27/2023    VLDL 53 (H) 02/27/2023        OTHERS-  Lab Results   Component Value Date    HGBA1C 4.9  11/10/2022    BNP 23 12/04/2020        I personally reviewed the patient's recent vitals, labs, medications, orders, EKGs, pertinent cardiac imaging/ echocardiography and ischemic evaluations including stress testing/ cardiac catheterization.    Assessment and Plan:    Stuart Merlos is a 50 y.o. male with pertinent history of sleep apnea, hypertension, nonsustained ventricular tachycardia, moderate obstructive sleep apnea on sleep study performed January 2021, history of recovered non-ischemic dilated cardiomyopathy (25-30% in 2015; no obstructive CAD) s/p ICD, mildly reduced ejection fraction 45% on echo performed 1/2021, no clear ischemia on nuclear stress test performed January 2021, low normal ejection fraction 50 to 55% with left ventricular hypertrophy on echo performed 4/3/2023 presents for follow-up. From a cardiac perspective he is relatively stable.  He does note that he stopped taking all of his cardiac meds over the last year or so.  He has numerous problems many of them are gastrointestinal in nature.  He is undergoing evaluation for chronic pain.  He states that severe pain throughout the right side of his body is ruining his life.  He also notes severe heat and cold intolerance preventing him from doing much of his normal activities.  He had questions about chronic Lyme disease.  We did review his recent echocardiogram and the natural history of cardiomyopathy.  Dyspnea on exertion is stable.      We will arrange for follow-up with endocrinology as well as rheumatology.    We discussed restarting losartan at a lower dose of 25 mg once daily.  This was sent to her pharmacy.    Please followup with me in Cardiology clinic within the next 9 months.  Please return to clinic sooner or seek emergent care if your symptoms reoccur or worsen.    Thank you for allowing me to participate in their care.  Please feel free to call me with any further questions or concerns.        Fidencio Templeton MD, FACC, LISA,  YANNI  Division of Cardiovascular Medicine  Medical Director, [facility] [place]  Clinical , [facility] [employer]  [email or web address]   Office:  [phone]

## 2025-05-08 ENCOUNTER — APPOINTMENT (OUTPATIENT)
Dept: RHEUMATOLOGY | Facility: CLINIC | Age: 50
End: 2025-05-08
Payer: COMMERCIAL

## 2025-05-08 VITALS
DIASTOLIC BLOOD PRESSURE: 96 MMHG | SYSTOLIC BLOOD PRESSURE: 154 MMHG | WEIGHT: 183 LBS | HEIGHT: 73 IN | BODY MASS INDEX: 24.25 KG/M2 | HEART RATE: 95 BPM

## 2025-05-08 DIAGNOSIS — M25.50 ARTHRALGIA, UNSPECIFIED JOINT: Primary | ICD-10-CM

## 2025-05-08 DIAGNOSIS — R07.81 RIB PAIN: ICD-10-CM

## 2025-05-08 DIAGNOSIS — M54.50 LOW BACK PAIN WITHOUT SCIATICA, UNSPECIFIED BACK PAIN LATERALITY, UNSPECIFIED CHRONICITY: ICD-10-CM

## 2025-05-08 PROCEDURE — 3077F SYST BP >= 140 MM HG: CPT | Performed by: INTERNAL MEDICINE

## 2025-05-08 PROCEDURE — 3008F BODY MASS INDEX DOCD: CPT | Performed by: INTERNAL MEDICINE

## 2025-05-08 PROCEDURE — 3080F DIAST BP >= 90 MM HG: CPT | Performed by: INTERNAL MEDICINE

## 2025-05-08 PROCEDURE — 99204 OFFICE O/P NEW MOD 45 MIN: CPT | Performed by: INTERNAL MEDICINE

## 2025-05-08 PROCEDURE — 1036F TOBACCO NON-USER: CPT | Performed by: INTERNAL MEDICINE

## 2025-05-08 ASSESSMENT — PAIN SCALES - GENERAL: PAINLEVEL_OUTOF10: 8
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
## 2025-05-09 ENCOUNTER — HOSPITAL ENCOUNTER (OUTPATIENT)
Dept: RADIOLOGY | Facility: CLINIC | Age: 50
Discharge: HOME | End: 2025-05-09
Payer: COMMERCIAL

## 2025-05-09 DIAGNOSIS — M54.50 LOW BACK PAIN WITHOUT SCIATICA, UNSPECIFIED BACK PAIN LATERALITY, UNSPECIFIED CHRONICITY: ICD-10-CM

## 2025-05-09 DIAGNOSIS — M25.50 ARTHRALGIA, UNSPECIFIED JOINT: ICD-10-CM

## 2025-05-09 LAB
BASOPHILS # BLD AUTO: 17 CELLS/UL (ref 0–200)
BASOPHILS NFR BLD AUTO: 0.2 %
CRP SERPL-MCNC: <3 MG/L
EOSINOPHIL # BLD AUTO: 50 CELLS/UL (ref 15–500)
EOSINOPHIL NFR BLD AUTO: 0.6 %
ERYTHROCYTE [DISTWIDTH] IN BLOOD BY AUTOMATED COUNT: 13 % (ref 11–15)
ERYTHROCYTE [SEDIMENTATION RATE] IN BLOOD BY WESTERGREN METHOD: 2 MM/H
HCT VFR BLD AUTO: 45.3 % (ref 38.5–50)
HGB BLD-MCNC: 15.4 G/DL (ref 13.2–17.1)
HLA-B27 QL NAA+PROBE: NORMAL
LYMPHOCYTES # BLD AUTO: 2000 CELLS/UL (ref 850–3900)
LYMPHOCYTES NFR BLD AUTO: 24.1 %
MCH RBC QN AUTO: 30.3 PG (ref 27–33)
MCHC RBC AUTO-ENTMCNC: 34 G/DL (ref 32–36)
MCV RBC AUTO: 89.2 FL (ref 80–100)
MONOCYTES # BLD AUTO: 548 CELLS/UL (ref 200–950)
MONOCYTES NFR BLD AUTO: 6.6 %
NEUTROPHILS # BLD AUTO: 5686 CELLS/UL (ref 1500–7800)
NEUTROPHILS NFR BLD AUTO: 68.5 %
PLATELET # BLD AUTO: 333 THOUSAND/UL (ref 140–400)
PMV BLD REES-ECKER: 9.9 FL (ref 7.5–12.5)
QUEST HLAB27 TYPING RESULTS REVIEWED BY:: NORMAL
RBC # BLD AUTO: 5.08 MILLION/UL (ref 4.2–5.8)
WBC # BLD AUTO: 8.3 THOUSAND/UL (ref 3.8–10.8)

## 2025-05-09 PROCEDURE — 72170 X-RAY EXAM OF PELVIS: CPT

## 2025-05-12 ENCOUNTER — APPOINTMENT (OUTPATIENT)
Dept: PRIMARY CARE | Facility: CLINIC | Age: 50
End: 2025-05-12
Payer: COMMERCIAL

## 2025-05-12 DIAGNOSIS — Z00.00 ROUTINE GENERAL MEDICAL EXAMINATION AT A HEALTH CARE FACILITY: Primary | ICD-10-CM

## 2025-05-12 DIAGNOSIS — M54.16 LUMBAR RADICULOPATHY: ICD-10-CM

## 2025-05-12 DIAGNOSIS — M54.14 THORACIC RADICULITIS: ICD-10-CM

## 2025-05-12 DIAGNOSIS — R68.89 SOMATIC COMPLAINTS, MULTIPLE: ICD-10-CM

## 2025-05-12 DIAGNOSIS — R53.83 OTHER FATIGUE: ICD-10-CM

## 2025-05-12 DIAGNOSIS — R63.4 ABNORMAL WEIGHT LOSS: ICD-10-CM

## 2025-05-12 DIAGNOSIS — K58.0 IRRITABLE BOWEL SYNDROME WITH DIARRHEA: ICD-10-CM

## 2025-05-12 DIAGNOSIS — I42.9 CARDIOMYOPATHY, UNSPECIFIED TYPE (MULTI): ICD-10-CM

## 2025-05-12 DIAGNOSIS — H91.93 HEARING PROBLEM OF BOTH EARS: ICD-10-CM

## 2025-05-12 DIAGNOSIS — R10.11 RUQ PAIN: ICD-10-CM

## 2025-05-12 DIAGNOSIS — I10 PRIMARY HYPERTENSION: ICD-10-CM

## 2025-05-12 DIAGNOSIS — R68.89 HEAT INTOLERANCE: ICD-10-CM

## 2025-05-12 DIAGNOSIS — R53.82 CHRONIC FATIGUE: ICD-10-CM

## 2025-05-12 DIAGNOSIS — F33.1 MODERATE EPISODE OF RECURRENT MAJOR DEPRESSIVE DISORDER: ICD-10-CM

## 2025-05-12 LAB — B BURGDOR IGG+IGM SER QL IA: <=0.9 INDEX

## 2025-05-12 ASSESSMENT — ENCOUNTER SYMPTOMS
DEPRESSION: 0
OCCASIONAL FEELINGS OF UNSTEADINESS: 0
LOSS OF SENSATION IN FEET: 0
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
## 2025-05-14 LAB
BASOPHILS # BLD AUTO: 17 CELLS/UL (ref 0–200)
BASOPHILS NFR BLD AUTO: 0.2 %
CRP SERPL-MCNC: <3 MG/L
EOSINOPHIL # BLD AUTO: 50 CELLS/UL (ref 15–500)
EOSINOPHIL NFR BLD AUTO: 0.6 %
ERYTHROCYTE [DISTWIDTH] IN BLOOD BY AUTOMATED COUNT: 13 % (ref 11–15)
ERYTHROCYTE [SEDIMENTATION RATE] IN BLOOD BY WESTERGREN METHOD: 2 MM/H
HCT VFR BLD AUTO: 45.3 % (ref 38.5–50)
HGB BLD-MCNC: 15.4 G/DL (ref 13.2–17.1)
HLA-B27 QL NAA+PROBE: NEGATIVE
LYMPHOCYTES # BLD AUTO: 2000 CELLS/UL (ref 850–3900)
LYMPHOCYTES NFR BLD AUTO: 24.1 %
MCH RBC QN AUTO: 30.3 PG (ref 27–33)
MCHC RBC AUTO-ENTMCNC: 34 G/DL (ref 32–36)
MCV RBC AUTO: 89.2 FL (ref 80–100)
MONOCYTES # BLD AUTO: 548 CELLS/UL (ref 200–950)
MONOCYTES NFR BLD AUTO: 6.6 %
NEUTROPHILS # BLD AUTO: 5686 CELLS/UL (ref 1500–7800)
NEUTROPHILS NFR BLD AUTO: 68.5 %
PLATELET # BLD AUTO: 333 THOUSAND/UL (ref 140–400)
PMV BLD REES-ECKER: 9.9 FL (ref 7.5–12.5)
QUEST HLAB27 TYPING RESULTS REVIEWED BY:: NORMAL
RBC # BLD AUTO: 5.08 MILLION/UL (ref 4.2–5.8)
WBC # BLD AUTO: 8.3 THOUSAND/UL (ref 3.8–10.8)

## 2025-05-17 VITALS
HEIGHT: 73 IN | HEART RATE: 64 BPM | DIASTOLIC BLOOD PRESSURE: 86 MMHG | WEIGHT: 182 LBS | SYSTOLIC BLOOD PRESSURE: 134 MMHG | BODY MASS INDEX: 24.12 KG/M2

## 2025-05-17 PROBLEM — R10.11 RUQ PAIN: Status: ACTIVE | Noted: 2025-05-17

## 2025-05-17 PROBLEM — Z00.00 ROUTINE GENERAL MEDICAL EXAMINATION AT A HEALTH CARE FACILITY: Status: ACTIVE | Noted: 2025-05-17

## 2025-05-17 PROBLEM — R53.83 OTHER FATIGUE: Status: ACTIVE | Noted: 2025-05-17

## 2025-05-17 PROBLEM — M54.14 THORACIC RADICULITIS: Status: ACTIVE | Noted: 2025-05-17

## 2025-05-17 PROBLEM — R68.89 HEAT INTOLERANCE: Status: ACTIVE | Noted: 2025-05-17

## 2025-05-17 PROBLEM — M54.16 LUMBAR RADICULOPATHY: Status: ACTIVE | Noted: 2025-05-17

## 2025-05-17 PROBLEM — H91.93 HEARING PROBLEM OF BOTH EARS: Status: ACTIVE | Noted: 2025-05-17

## 2025-05-17 PROBLEM — R63.4 ABNORMAL WEIGHT LOSS: Status: ACTIVE | Noted: 2025-05-17

## 2025-05-17 PROBLEM — R53.82 CHRONIC FATIGUE: Status: ACTIVE | Noted: 2025-05-17

## 2025-05-17 NOTE — ASSESSMENT & PLAN NOTE
Denies being depressed denies the possibility of symptoms being psychosomatic does not want to see psychiatry

## 2025-05-17 NOTE — ASSESSMENT & PLAN NOTE
Patient wants test for SIBO but does not want to see GI states he is already seen GI and they were not helpful we will start rifaximin

## 2025-05-17 NOTE — ASSESSMENT & PLAN NOTE
Has had multiple visits with multiple physicians and multiple specialties has had multiple test CT scans MRIs EGD colonoscopy labs which have all essentially been negative all the studies reviewed with the patient patient continues since nsaids states that there is something wrong with him that has not been diagnosed so far reviewed the CT of his abdomen done previously which did not show any psoas injury will repeat CT scan as last CT was 3 years ago encouraged to see psychiatry patient refused

## 2025-05-17 NOTE — ASSESSMENT & PLAN NOTE
Up-to-date colonoscopy EGD labs refused immunization will revisit shingles vaccine encouraged to eat healthy and workout 30 minutes every day

## 2025-05-17 NOTE — ASSESSMENT & PLAN NOTE
Has followed up with cardiology regularly ejection fraction has improved initial ejection fraction was 25% taking meds as prescribed limiting sodium intake has active work schedule

## 2025-05-21 DIAGNOSIS — I42.9 CARDIOMYOPATHY, UNSPECIFIED TYPE (MULTI): Primary | ICD-10-CM

## 2025-05-28 ENCOUNTER — HOSPITAL ENCOUNTER (OUTPATIENT)
Dept: RADIOLOGY | Facility: CLINIC | Age: 50
Discharge: HOME | End: 2025-05-28
Payer: COMMERCIAL

## 2025-05-28 DIAGNOSIS — I42.9 CARDIOMYOPATHY, UNSPECIFIED TYPE (MULTI): ICD-10-CM

## 2025-05-28 PROCEDURE — 71046 X-RAY EXAM CHEST 2 VIEWS: CPT

## 2025-05-28 PROCEDURE — 71046 X-RAY EXAM CHEST 2 VIEWS: CPT | Performed by: STUDENT IN AN ORGANIZED HEALTH CARE EDUCATION/TRAINING PROGRAM

## 2025-05-29 ENCOUNTER — HOSPITAL ENCOUNTER (OUTPATIENT)
Dept: RADIOLOGY | Facility: HOSPITAL | Age: 50
Discharge: HOME | End: 2025-05-29
Payer: COMMERCIAL

## 2025-05-29 DIAGNOSIS — R10.11 RUQ PAIN: ICD-10-CM

## 2025-05-29 PROCEDURE — 74177 CT ABD & PELVIS W/CONTRAST: CPT

## 2025-05-29 PROCEDURE — 2550000001 HC RX 255 CONTRASTS: Mod: JZ | Performed by: FAMILY MEDICINE

## 2025-05-29 RX ADMIN — IOHEXOL 100 ML: 350 INJECTION, SOLUTION INTRAVENOUS at 10:02

## 2025-05-31 DIAGNOSIS — K80.20 CALCULUS OF GALLBLADDER WITHOUT CHOLECYSTITIS WITHOUT OBSTRUCTION: Primary | ICD-10-CM

## 2025-06-09 ENCOUNTER — HOSPITAL ENCOUNTER (OUTPATIENT)
Dept: CARDIOLOGY | Facility: CLINIC | Age: 50
Discharge: HOME | End: 2025-06-09
Payer: COMMERCIAL

## 2025-06-09 DIAGNOSIS — I42.9 CARDIOMYOPATHY, UNSPECIFIED TYPE (MULTI): ICD-10-CM

## 2025-06-09 DIAGNOSIS — Z95.810 PRESENCE OF AUTOMATIC CARDIOVERTER/DEFIBRILLATOR (AICD): ICD-10-CM

## 2025-06-24 ENCOUNTER — HOSPITAL ENCOUNTER (OUTPATIENT)
Dept: RADIOLOGY | Facility: HOSPITAL | Age: 50
Discharge: HOME | End: 2025-06-24
Payer: COMMERCIAL

## 2025-06-24 DIAGNOSIS — K80.20 CALCULUS OF GALLBLADDER WITHOUT CHOLECYSTITIS WITHOUT OBSTRUCTION: ICD-10-CM

## 2025-06-24 PROCEDURE — 76705 ECHO EXAM OF ABDOMEN: CPT | Performed by: RADIOLOGY

## 2025-06-24 PROCEDURE — 76705 ECHO EXAM OF ABDOMEN: CPT

## 2025-06-27 ENCOUNTER — APPOINTMENT (OUTPATIENT)
Dept: NEUROLOGY | Facility: CLINIC | Age: 50
End: 2025-06-27
Payer: COMMERCIAL

## 2025-06-27 ENCOUNTER — OFFICE VISIT (OUTPATIENT)
Dept: CARDIOLOGY | Facility: CLINIC | Age: 50
End: 2025-06-27
Payer: COMMERCIAL

## 2025-06-27 DIAGNOSIS — I42.0 DILATED CARDIOMYOPATHY (MULTI): ICD-10-CM

## 2025-06-27 DIAGNOSIS — R53.83 OTHER FATIGUE: ICD-10-CM

## 2025-06-27 DIAGNOSIS — I42.9 CARDIOMYOPATHY, UNSPECIFIED TYPE (MULTI): ICD-10-CM

## 2025-06-27 DIAGNOSIS — Z95.810 PRESENCE OF AUTOMATIC (IMPLANTABLE) CARDIAC DEFIBRILLATOR: ICD-10-CM

## 2025-06-27 DIAGNOSIS — R10.13 EPIGASTRIC PAIN: ICD-10-CM

## 2025-06-27 DIAGNOSIS — Z95.0 PACEMAKER: ICD-10-CM

## 2025-06-27 DIAGNOSIS — E78.00 PURE HYPERCHOLESTEROLEMIA: ICD-10-CM

## 2025-06-27 DIAGNOSIS — R14.0 ABDOMINAL BLOATING: ICD-10-CM

## 2025-06-27 DIAGNOSIS — Z95.810 PRESENCE OF AUTOMATIC CARDIOVERTER/DEFIBRILLATOR (AICD): Primary | ICD-10-CM

## 2025-06-27 DIAGNOSIS — I50.22 CHRONIC SYSTOLIC HEART FAILURE: ICD-10-CM

## 2025-06-27 DIAGNOSIS — R07.89 ATYPICAL CHEST PAIN: ICD-10-CM

## 2025-06-27 DIAGNOSIS — I42.8 OTHER CARDIOMYOPATHY: ICD-10-CM

## 2025-06-27 DIAGNOSIS — R63.4 ABNORMAL WEIGHT LOSS: ICD-10-CM

## 2025-06-27 PROCEDURE — 99212 OFFICE O/P EST SF 10 MIN: CPT

## 2025-06-27 NOTE — PROGRESS NOTES
Follow-up visit    HPI:    Stuart Merlos is a 50 y.o. male with pertinent history of sleep apnea, hypertension, anxiety, significant GI problems long-term, multiple somatic issues, he was seen urgently in clinic nonsustained ventricular tachycardia, moderate obstructive sleep apnea on sleep study performed January 2021, history of recovered non-ischemic dilated cardiomyopathy with moderate to severely reduced LVEF of 25-30% in 2015 with no obstructive CAD s/p ICD, he did have an episode of nonsustained ventricular tachycardia shortly after ICD implantation, mildly reduced ejection fraction 45% on echo performed 1/2021, no clear ischemia on nuclear stress test performed January 2021, low normal ejection fraction 50 to 55% with left ventricular hypertrophy on echo performed 4/3/2023, preserved LVEF 60% without significant left ventricular hypertrophy on echocardiogram performed 11/13/2024 presents for follow-up.     He was seen urgently in clinic as an add-on.  He continues to have very significant symptoms of weight loss and generalized fatigue with diffuse pain.  He also continues to have significant bloating and gas.  He has been seen by multiple specialists and their visits were recently discussed and reviewed.  He states that he was recently speaking to friends who believe that he certainly has chronic Lyme disease.  Of note he did have blood work that was negative.  He is requesting central spinal fluid analysis.  I did discuss his outside of my realm of asked [facility] cardiologist.  We did review his recent device transmissions.  No significant arrhythmias noted.  His device is at about 12 months until end-of-life.  We did discuss that he benefit from following up with electrophysiology to discuss the option of replacing the battery for his device versus considering alternatives such as explanting his device in 1 year.  He is agreeable.  He does have follow-up with rheumatology and endocrinology.  He has  recently seen neurology as well.   He does note that he stopped taking all of his cardiac meds over the last year or so.  He also notes severe heat and cold intolerance preventing him from doing much of his normal activities.  We did review his recent echocardiogram and the natural history of cardiomyopathy.  Dyspnea on exertion is stable.  No exacerbating or relieving factors.  Patient denies chest pain and angina.  Pt denies orthopnea, and paroxysmal nocturnal dyspnea.  Pt denies worsening lower extremity edema.  Pt denies palpitations or syncope.  No recent falls.  No fever or chills.  No cough.  No change in bowel or bladder habits.  No sick contacts.  No recent travel.    12 point review of systems including (Constitutional, Eyes, ENMT, Respiratory, Cardiac, Gastrointestinal, Neurological, Psychiatric, and Hematologic) was performed and is otherwise negative.    Past medical history:  As above.    Medications were reviewed.    Allergies were reviewed.    Social history:  Patient denies smoking, alcohol abuse, or illicit drug use.    Family history:  No sudden cardiac death or premature coronary artery disease.     Allergies reviewed: Atorvastatin and Penicillins     Medications reviewed:   Current Outpatient Medications   Medication Instructions    losartan (COZAAR) 25 mg, oral, Daily        Vitals reviewed: There were no vitals taken for this visit.    Physical Exam:   General:  Patient is awake, alert, and oriented.  Patient is in no acute distress.  HEENT:  Pupils equal and reactive.  Normocephalic.  Moist mucosa.    Neck:  No thyromegaly.  Normal Jugular Venous Pressure.  Cardiovascular:  Regular rate and rhythm.  Normal S1 and S2.  1/6 FREDDIE.  ICD in place  Pulmonary:  Clear to auscultation bilaterally.  Abdomen:  Soft. Non-tender.   Non-distended.  Positive bowel sounds.  Lower Extremities:  2+ pedal pulses. No LE edema.  Neurologic:  Cranial nerves intact.  No focal deficit.   Skin: Skin warm and dry,  normal skin turgor.   Psychiatric: Normal affect.    Last Labs:  CBC -      Lab Results   Component Value Date    WBC 8.3 05/08/2025    HGB 15.4 05/08/2025    HCT 45.3 05/08/2025     05/08/2025        CMP-  Lab Results   Component Value Date    GLUCOSE 95 03/01/2023     03/01/2023    K 5.2 03/01/2023     03/01/2023    CO2 28 03/01/2023    ANIONGAP 12 03/01/2023    BUN 11 03/01/2023    CREATININE 0.94 03/01/2023    CALCIUM 10.1 03/01/2023    PROT 6.8 03/01/2023    ALBUMIN 4.5 03/01/2023    AST 14 03/01/2023    ALT 17 03/01/2023    ALKPHOS 86 03/01/2023    BILITOT 0.8 03/01/2023        LIPIDS-  Lab Results   Component Value Date    CHOL 243 (H) 02/27/2023    TRIG 264 (H) 02/27/2023    HDL 42.1 02/27/2023    CHHDL 5.8 (A) 02/27/2023    VLDL 53 (H) 02/27/2023        OTHERS-  Lab Results   Component Value Date    HGBA1C 4.9 11/10/2022    BNP 23 12/04/2020        I personally reviewed the patient's recent vitals, labs, medications, orders, EKGs, pertinent cardiac imaging/ echocardiography and ischemic evaluations including stress testing/ cardiac catheterization.    Assessment and Plan:    Stuart Merlos is a 50 y.o. male with pertinent history of sleep apnea, hypertension, anxiety, significant GI problems long-term, multiple somatic issues, he was seen urgently in clinic nonsustained ventricular tachycardia, moderate obstructive sleep apnea on sleep study performed January 2021, history of recovered non-ischemic dilated cardiomyopathy with moderate to severely reduced LVEF of 25-30% in 2015 with no obstructive CAD s/p ICD, he did have an episode of nonsustained ventricular tachycardia shortly after ICD implantation, mildly reduced ejection fraction 45% on echo performed 1/2021, no clear ischemia on nuclear stress test performed January 2021, low normal ejection fraction 50 to 55% with left ventricular hypertrophy on echo performed 4/3/2023, preserved LVEF 60% without significant left ventricular  hypertrophy on echocardiogram performed 11/13/2024 presents for follow-up.   He was seen urgently in clinic as an add-on.  He continues to have very significant symptoms of weight loss and generalized fatigue with diffuse pain.  He also continues to have significant bloating and gas.  He has been seen by multiple specialists and their visits were recently discussed and reviewed.  He states that he was recently speaking to friends who believe that he certainly has chronic Lyme disease.  Of note he did have blood work that was negative.  He is requesting central spinal fluid analysis.  I did discuss his outside of my realm of asked [facility] cardiologist.  We did review his recent device transmissions.  No significant arrhythmias noted.  His device is at about 12 months until end-of-life.  We did discuss that he benefit from following up with electrophysiology to discuss the option of replacing the battery for his device versus considering alternatives such as explanting his device in 1 year.  He is agreeable.  He does have follow-up with rheumatology and endocrinology.  He has recently seen neurology as well.   He does note that he stopped taking all of his cardiac meds over the last year or so.  He also notes severe heat and cold intolerance preventing him from doing much of his normal activities.  We did review his recent echocardiogram and the natural history of cardiomyopathy.  Dyspnea on exertion is stable.      We will see if your primary care team or neurology team believes that it would be reasonable to proceed with central spinal fluid analysis of Lyme disease or other neurologic conditions.    Please follow-up with rheumatology on 9/11/2025 and endocrinology on 9/25/2025 as scheduled.    We will arrange for follow-up with electrophysiology to discuss that your device or defibrillator is approaching end-of-life in the next 1 year.    We will obtain a transthoracic echocardiogram for structural evaluation  including ejection fraction, assessment of regional wall motion abnormalities or valvular disease, and further evaluation of hemodynamics prior to your follow-up visit.    Please followup with me in Cardiology clinic as scheduled with me 1/26/2026.  Please return to clinic sooner or seek emergent care if your symptoms reoccur or worsen.    Thank you for allowing me to participate in their care.  Please feel free to call me with any further questions or concerns.        Fidencio Templeton MD, FACC, [facility]AN[facility]ON[facility]ALE[facility]  Division of Cardiovascular Medicine  Medical Director, [facility] [place]  Clinical , [employer]  [email or web address]   Office:  [phone]

## 2025-06-27 NOTE — PATIENT INSTRUCTIONS
We will see if your primary care team or neurology team believes that it would be reasonable to proceed with central spinal fluid analysis of Lyme disease or other neurologic conditions.    Please follow-up with rheumatology on 9/11/2025 and endocrinology on 9/25/2025 as scheduled.    We will arrange for follow-up with electrophysiology to discuss that your device or defibrillator is approaching end-of-life in the next 1 year.    We will obtain a transthoracic echocardiogram for structural evaluation including ejection fraction, assessment of regional wall motion abnormalities or valvular disease, and further evaluation of hemodynamics prior to your follow-up visit.    Please followup with me in Cardiology clinic as scheduled with me 1/26/2026.  Please return to clinic sooner or seek emergent care if your symptoms reoccur or worsen.

## 2025-07-01 DIAGNOSIS — K80.20 GALL BLADDER STONES: ICD-10-CM

## 2025-07-10 ENCOUNTER — OFFICE VISIT (OUTPATIENT)
Dept: SURGERY | Facility: CLINIC | Age: 50
End: 2025-07-10
Payer: COMMERCIAL

## 2025-07-10 VITALS — BODY MASS INDEX: 24.01 KG/M2 | WEIGHT: 182 LBS

## 2025-07-10 DIAGNOSIS — K80.20 GALL BLADDER STONES: ICD-10-CM

## 2025-07-10 DIAGNOSIS — K80.11 CALCULUS OF GALLBLADDER WITH CHRONIC CHOLECYSTITIS WITH OBSTRUCTION: ICD-10-CM

## 2025-07-10 PROCEDURE — 99203 OFFICE O/P NEW LOW 30 MIN: CPT | Performed by: SURGERY

## 2025-07-10 ASSESSMENT — ENCOUNTER SYMPTOMS
CONFUSION: 0
APPETITE CHANGE: 1
AGITATION: 0
ABDOMINAL DISTENTION: 1
ACTIVITY CHANGE: 0
FEVER: 0
FLANK PAIN: 1
CHILLS: 0
DIZZINESS: 0
DIAPHORESIS: 0
SHORTNESS OF BREATH: 0
UNEXPECTED WEIGHT CHANGE: 1
ABDOMINAL PAIN: 1
NAUSEA: 1
DIARRHEA: 1
CONSTIPATION: 1
BLOOD IN STOOL: 1

## 2025-07-10 NOTE — PROGRESS NOTES
Subjective   Patient ID: Stuart Merlos is a 50 y.o. male who presents for No chief complaint on file..    The patient is a 50-year-old man with a history of RICHAR, hypertension, dilated cardiomyopathy with recovered ejection fraction and status post ICD placement, ventricular tachycardia, and somatic issues who presents for evaluation of gallstones.  I had seen the patient back in 2022 for some chronic abdominal issues he was having.  He has had extensive workup for his abdominal issues including EGD, colonoscopy, small bowel follow-through study, right upper quadrant ultrasound, and HIDA scan.  At the time of diagnostic laparoscopy he had no acute findings.  We did close a small umbilical hernia that was less than 1 cm.  Since that time he has continued to have right sided pains, which begin along his upper back, radiate down along his right lateral flank, and go down to his right lower extremity.  He has very irregular bowel function, only going small amounts, and at irregular intervals.  He has seen multiple specialists including cardiology, neurology, GI, and rheumatology regarding his multiple issues.  He recently had another CT scan as well as right upper quadrant ultrasound which showed gallstones, but no other findings of acute cholecystitis or inflammation.  The patient states that everyone in his family have had their gallbladders removed for 1 reason or another, and he would like to have his gallbladder removed as well.  He does voice understanding that the gallbladder is unlikely to be the source of all of his pain issues and irregular bowel function issues.  He has also lost almost 40 lbs since we last saw him in 2022.      Review of Systems   Constitutional:  Positive for appetite change and unexpected weight change. Negative for activity change, chills, diaphoresis and fever.   Respiratory:  Negative for shortness of breath.    Cardiovascular:  Negative for chest pain.   Gastrointestinal:  Positive  for abdominal distention, abdominal pain, blood in stool, constipation, diarrhea and nausea.   Genitourinary:  Positive for flank pain.   Neurological:  Negative for dizziness.   Psychiatric/Behavioral:  Negative for agitation and confusion.    All other systems reviewed and are negative.      Objective   Physical Exam  Constitutional:       Appearance: Normal appearance.   Pulmonary:      Effort: Pulmonary effort is normal.   Abdominal:      General: Abdomen is flat.      Palpations: Abdomen is soft.   Neurological:      General: No focal deficit present.      Mental Status: He is alert.   Psychiatric:         Mood and Affect: Mood normal.         Assessment/Plan   The patient is a 50-year-old man who has been dealing with multiple GI issues as well as multiple pain issues for several years now.  He has had extensive workup including CT scans, EGDs, ultrasound, and colonoscopy.  He has also seen cardiology, rheumatology, neurology, and GI.  I had done a diagnostic laparoscopy for him back in 2022 which was essentially negative for any acute pathologies.  He now presents for evaluation of gallstones.  He does have several gallstones within the neck of his gallbladder.  No other secondary findings of cholecystitis or inflammation.  He states that other family members have had their gallbladders removed, and he would also like to have his gallbladder removed.  I had a long discussion with the patient regarding his symptoms.  I explained to him that removal of his gallbladder is unlikely to fix all of his pain issues as well as GI issues.  The patient voiced understanding, and would like to proceed with surgery.  I explained the risks and benefits of undergoing a laparoscopic cholecystectomy with cholangiogram.  These risks include risk of bleeding, infection, injury to the bile ducts, or injury to surrounding structures.  I also explained to him that some of his GI function may be irregular immediately following the  surgery, and may persist for several weeks.  We also discussed staying on a low-fat diet, and avoiding greasy and fried foods as he is recovering.  The patient voiced understanding, and would like to proceed with surgery.         Darius Brizuela MD 07/10/25 9:39 AM

## 2025-07-24 ENCOUNTER — CLINICAL SUPPORT (OUTPATIENT)
Dept: PREADMISSION TESTING | Facility: HOSPITAL | Age: 50
End: 2025-07-24
Payer: COMMERCIAL

## 2025-07-24 RX ORDER — DEXTROMETHORPHAN HYDROBROMIDE, GUAIFENESIN 5; 100 MG/5ML; MG/5ML
1300 LIQUID ORAL EVERY 8 HOURS PRN
COMMUNITY

## 2025-07-24 RX ORDER — BISMUTH SUBSALICYLATE 262 MG
1 TABLET,CHEWABLE ORAL DAILY
COMMUNITY

## 2025-07-24 ASSESSMENT — DUKE ACTIVITY SCORE INDEX (DASI)
CAN YOU DO YARD WORK LIKE RAKING LEAVES, WEEDING OR PUSHING A MOWER: YES
CAN YOU PARTICIPATE IN MODERATE RECREATIONAL ACTIVITIES LIKE GOLF, BOWLING, DANCING, DOUBLES TENNIS OR THROWING A BASEBALL OR FOOTBALL: YES
CAN YOU CLIMB A FLIGHT OF STAIRS OR WALK UP A HILL: YES
CAN YOU WALK A BLOCK OR TWO ON LEVEL GROUND: YES
CAN YOU DO HEAVY WORK AROUND THE HOUSE LIKE SCRUBBING FLOORS OR LIFTING AND MOVING HEAVY FURNITURE: YES
CAN YOU PARTICIPATE IN STRENOUS SPORTS LIKE SWIMMING, SINGLES TENNIS, FOOTBALL, BASKETBALL, OR SKIING: NO
DASI METS SCORE: 8
CAN YOU DO LIGHT WORK AROUND THE HOUSE LIKE DUSTING OR WASHING DISHES: YES
CAN YOU WALK INDOORS, SUCH AS AROUND YOUR HOUSE: YES
CAN YOU DO MODERATE WORK AROUND THE HOUSE LIKE VACUUMING, SWEEPING FLOORS OR CARRYING GROCERIES: YES
TOTAL_SCORE: 42.7
CAN YOU HAVE SEXUAL RELATIONS: YES
CAN YOU RUN A SHORT DISTANCE: NO
CAN YOU TAKE CARE OF YOURSELF (EAT, DRESS, BATHE, OR USE TOILET): YES

## 2025-07-24 NOTE — CPM/PAT NURSE NOTE
CPM/BALTA Nurse Note      Name: Stuart Merlos (Stuart GUZMAN Reillyjesak)  /Age: 1975/50 y.o.       Medical History[1]    Surgical History[2]    Patient  reports being sexually active.    Family History[3]    Allergies[4]    Prior to Admission medications   Medication Sig Start Date End Date Taking? Authorizing Provider   acetaminophen (Tylenol 8 HOUR) 650 mg ER tablet Take 2 tablets (1,300 mg) by mouth every 8 hours if needed for mild pain (1 - 3). Do not crush, chew, or split.    Historical Provider, MD   losartan (Cozaar) 25 mg tablet Take 1 tablet (25 mg) by mouth once daily. 25  Fidencio KOTHARI MD   multivitamin tablet Take 1 tablet by mouth once daily.    Historical Provider, MD BALTA DAS     DASI Risk Score      Flowsheet Row Clinical Support from 2025 in [facility]   Can you take care of yourself (eat, dress, bathe, or use toilet)?  2.75 filed at 2025 1522   Can you walk indoors, such as around your house? 1.75 filed at 2025 1522   Can you walk a block or two on level ground?  2.75 filed at 2025 1522   Can you climb a flight of stairs or walk up a hill? 5.5 filed at 2025 1522   Can you run a short distance? 0 filed at 2025 1522   Can you do light work around the house like dusting or washing dishes? 2.7 filed at 2025 1522   Can you do moderate work around the house like vacuuming, sweeping floors or carrying groceries? 3.5 filed at 2025 1522   Can you do heavy work around the house like scrubbing floors or lifting and moving heavy furniture?  8 filed at 2025 1522   Can you do yard work like raking leaves, weeding or pushing a mower? 4.5 filed at 2025 1522   Can you have sexual relations? 5.25 filed at 2025 1522   Can you participate in moderate recreational activities like golf, bowling, dancing, doubles tennis or throwing a baseball or football? 6 filed at 2025 0518   Can you participate in strenous  sports like swimming, singles tennis, football, basketball, or skiing? 0 filed at 07/24/2025 1522   DASI SCORE 42.7 filed at 07/24/2025 1522   METS Score (Will be calculated only when all the questions are answered) 8 filed at 07/24/2025 1522     Caprini DVT Assessment    No data to display  Modified Frailty Index    No data to display  [id number] Stroke Risk Points  Current as of just now        N/A 0 to 9 Points:      Last Change: N/A          The VGL5CT9-CXJd risk score (Lip ELVIS, et al. 2009. © 2010 American [employer]) quantifies the risk of stroke for a patient with atrial fibrillation. For patients without atrial fibrillation or under the age of 18 this score appears as N/A. Higher score values generally indicate higher risk of stroke.        This score is not applicable to this patient. Components are not calculated.          Revised Cardiac Risk Index    No data to display  Apfel Simplified Score    No data to display  Risk Analysis Index Results This Encounter    No data found in the last 10 encounters.       Stop Bang Score      Flowsheet Row Clinical Support from 7/24/2025 in [facility]   Do you snore loudly? 1 filed at 07/24/2025 1514   Do you often feel tired or fatigued after your sleep? 1 filed at 07/24/2025 1514   Has anyone ever observed you stop breathing in your sleep? 0 filed at 07/24/2025 1514   Do you have or are you being treated for high blood pressure? 1 filed at 07/24/2025 1514   Recent BMI (Calculated) 24 filed at 07/24/2025 1514   Is BMI greater than 35 kg/m2? 0=No filed at 07/24/2025 1514   Age older than 50 years old? 1=Yes filed at 07/24/2025 1514   Is your neck circumference greater than 17 inches (Male) or 16 inches (Female)? 0 filed at 07/24/2025 1514   Gender - Male 1=Yes filed at 07/24/2025 1514   STOP-BANG Total Score 5 filed at 07/24/2025 1514     Prodigy: High Risk  Total Score: 8              Prodigy Gender Score          ARISCAT Score for  Postoperative Pulmonary Complications    No data to display  Efraín Perioperative Risk for Myocardial Infarction or Cardiac Arrest (OMAR)    No data to display      Nurse Plan of Action:   Triage nurse screening call completed.  Patient transferred to scheduling to schedule PAT office visit before surgery.  Cardiac clearance request faxed to Dr. Templeton (last office visit 6/27/25).  Patient has pacemaker/ICD, last device check/interrogation was 6/9/25.    Before placing the call, time was spent reviewing care plans (barriers, goals, etc.), medication lists, previous doctor visits, allergy lists, demographics, insurance information, previous care notes, last appointment, social determinants of health, wellness visits, upcoming appointments and any recent hospital visits.           [1]   Past Medical History:  Diagnosis Date    Body mass index (BMI) 27.0-27.9, adult 10/11/2021    Body mass index (BMI) of 27.0 to 27.9 in adult    Heart disease     Hypertension    [2]   Past Surgical History:  Procedure Laterality Date    HERNIA REPAIR  2021    INSERT / REPLACE / REMOVE PACEMAKER     [3]   Family History  Problem Relation Name Age of Onset    Heart disease Mother      Diabetes Mother      Heart disease Father     [4]   Allergies  Allergen Reactions    Atorvastatin Unknown     muscle aches    Fatigue, muscle aches, confusion, palpitations    Penicillins Unknown

## 2025-07-29 ENCOUNTER — PRE-ADMISSION TESTING (OUTPATIENT)
Dept: PREADMISSION TESTING | Facility: HOSPITAL | Age: 50
End: 2025-07-29
Payer: COMMERCIAL

## 2025-07-29 VITALS
DIASTOLIC BLOOD PRESSURE: 83 MMHG | BODY MASS INDEX: 22.92 KG/M2 | SYSTOLIC BLOOD PRESSURE: 128 MMHG | WEIGHT: 172.9 LBS | RESPIRATION RATE: 18 BRPM | HEIGHT: 73 IN | TEMPERATURE: 97.3 F | OXYGEN SATURATION: 100 % | HEART RATE: 76 BPM

## 2025-07-29 DIAGNOSIS — Z01.818 PRE-OP EXAMINATION: Primary | ICD-10-CM

## 2025-07-29 LAB
APPEARANCE UR: CLEAR
BILIRUB UR STRIP.AUTO-MCNC: NEGATIVE MG/DL
COLOR UR: NORMAL
GLUCOSE UR STRIP.AUTO-MCNC: NORMAL MG/DL
KETONES UR STRIP.AUTO-MCNC: NEGATIVE MG/DL
LEUKOCYTE ESTERASE UR QL STRIP.AUTO: NEGATIVE
NITRITE UR QL STRIP.AUTO: NEGATIVE
PH UR STRIP.AUTO: 5.5 [PH]
PROT UR STRIP.AUTO-MCNC: NEGATIVE MG/DL
RBC # UR STRIP.AUTO: NEGATIVE MG/DL
SP GR UR STRIP.AUTO: 1.02
UROBILINOGEN UR STRIP.AUTO-MCNC: NORMAL MG/DL

## 2025-07-29 PROCEDURE — 99214 OFFICE O/P EST MOD 30 MIN: CPT

## 2025-07-29 PROCEDURE — 81003 URINALYSIS AUTO W/O SCOPE: CPT

## 2025-07-29 ASSESSMENT — DUKE ACTIVITY SCORE INDEX (DASI)
CAN YOU CLIMB A FLIGHT OF STAIRS OR WALK UP A HILL: YES
CAN YOU PARTICIPATE IN MODERATE RECREATIONAL ACTIVITIES LIKE GOLF, BOWLING, DANCING, DOUBLES TENNIS OR THROWING A BASEBALL OR FOOTBALL: YES
CAN YOU DO LIGHT WORK AROUND THE HOUSE LIKE DUSTING OR WASHING DISHES: YES
CAN YOU WALK INDOORS, SUCH AS AROUND YOUR HOUSE: YES
CAN YOU DO MODERATE WORK AROUND THE HOUSE LIKE VACUUMING, SWEEPING FLOORS OR CARRYING GROCERIES: YES
CAN YOU RUN A SHORT DISTANCE: NO
CAN YOU DO YARD WORK LIKE RAKING LEAVES, WEEDING OR PUSHING A MOWER: YES
CAN YOU WALK A BLOCK OR TWO ON LEVEL GROUND: YES
CAN YOU HAVE SEXUAL RELATIONS: YES
TOTAL_SCORE: 42.7
CAN YOU PARTICIPATE IN STRENOUS SPORTS LIKE SWIMMING, SINGLES TENNIS, FOOTBALL, BASKETBALL, OR SKIING: NO
DASI METS SCORE: 8
CAN YOU DO HEAVY WORK AROUND THE HOUSE LIKE SCRUBBING FLOORS OR LIFTING AND MOVING HEAVY FURNITURE: YES
CAN YOU TAKE CARE OF YOURSELF (EAT, DRESS, BATHE, OR USE TOILET): YES

## 2025-07-29 ASSESSMENT — ENCOUNTER SYMPTOMS
EYES NEGATIVE: 1
NAUSEA: 1
DIFFICULTY URINATING: 1
DIARRHEA: 1
CONSTIPATION: 1
BACK PAIN: 1
ABDOMINAL PAIN: 1
NEUROLOGICAL NEGATIVE: 1
PSYCHIATRIC NEGATIVE: 1
RESPIRATORY NEGATIVE: 1
FLANK PAIN: 1
FEVER: 1
DIAPHORESIS: 1
VOMITING: 1
ALLERGIC/IMMUNOLOGIC NEGATIVE: 1
CARDIOVASCULAR NEGATIVE: 1
ENDOCRINE NEGATIVE: 1
HEMATOLOGIC/LYMPHATIC NEGATIVE: 1

## 2025-07-29 ASSESSMENT — PAIN SCALES - GENERAL: PAINLEVEL_OUTOF10: 8

## 2025-07-29 ASSESSMENT — PAIN - FUNCTIONAL ASSESSMENT: PAIN_FUNCTIONAL_ASSESSMENT: 0-10

## 2025-07-29 ASSESSMENT — PAIN DESCRIPTION - DESCRIPTORS: DESCRIPTORS: ACHING;DISCOMFORT

## 2025-07-29 NOTE — PREPROCEDURE INSTRUCTIONS
Medication List            Accurate as of July 29, 2025  7:13 AM. Always use your most recent med list.                acetaminophen 650 mg ER tablet  Commonly known as: Tylenol 8 HOUR  Medication Adjustments for Surgery: Take/Use as prescribed     losartan 25 mg tablet  Commonly known as: Cozaar  Take 1 tablet (25 mg) by mouth once daily.  Medication Adjustments for Surgery: Do Not take on the morning of surgery     multivitamin tablet  Additional Medication Adjustments for Surgery: Take last dose 7 days before surgery                      Why must I stop eating and drinking near surgery time?  With sedation, food or liquid in your stomach can enter your lungs causing serious complications  Increases nausea and vomiting    When do I need to stop eating and drinking before my surgery?   Do not eat or drink after midnight the night before your surgery/procedure.  You may have small sips of water to take your medication.    PAT DISCHARGE INSTRUCTIONS    The Same Day Surgery (SDS) Department of the hospital where your procedure will be performed will contact you after 2:00 PM the day before your surgery. If you are scheduled on a Monday, or a Tuesday following a Monday holiday, they will call on the last business day prior to your surgery.  Please check your voicemail for any missed messages.    [facility]  [address] [place] [address] [phone]  Second Floor      [address], [address] [phone]  Second Floor  *PLEASE CALL ABOVE NUMBER FOR ARRIVAL TIME       [facility]  [address] [place] [address] [phone]    Please let your surgeon know if:      You develop any open sores, shingles, burning or painful urination as these may increase your risk of an infection.   You no longer wish to have the surgery.   Any other personal circumstances change that  may lead to the need to cancel or defer this surgery-such as being sick or getting admitted to any hospital within one week of your planned procedure.    Your contact details change, such as a change of address or phone number.    Starting now:     Please DO NOT drink alcohol or smoke for 24 hours before surgery. It is well known that quitting smoking can make a huge difference to your health and recovery from surgery. The longer you abstain from smoking, the better your chances of a healthy recovery. If you need help with quitting, call 1-800-QUIT-NOW to be connected to a trained counselor who will discuss the best methods to help you quit.     Before your surgery:    Please stop all supplements 7 days prior to surgery. Or as directed by your surgeon.   Please stop taking NSAID pain medicine such as Advil and Motrin 7 days before surgery.    If you develop any fever, cough, cold, rashes, cuts, scratches, scrapes, urinary symptoms or infection anywhere on your body (including teeth and gums) prior to surgery, please call your surgeon’s office as soon as possible. This may require treatment to reduce the chance of cancellation on the day of surgery.    The day before your surgery:   DIET- Please follow the diet instructions at the top of your packet.   Get a good night’s rest.  Use the special soap for bathing if you have been instructed to use one.    Scheduled surgery times may change and you will be notified if this occurs - please check your personal voicemail for any updates.     On the morning of surgery:   Wear comfortable, loose fitting clothes which open in the front. Please do not wear moisturizers, creams, lotions, makeup or perfume.    Please bring with you to surgery:   Photo ID and insurance card   Current list of medicines and allergies   Pacemaker/ Defibrillator/Heart stent cards   CPAP machine and mask    Slings/ splints/ crutches   A copy of your complete advanced directive/DHPOA.    Please do NOT  bring with you to surgery:   All jewelry and valuables should be left at home.   Prosthetic devices such as contact lenses, hearing aids, dentures, eyelash extensions, hairpins and body piercings must be removed prior to going in to the surgical suite.    After outpatient surgery:   A responsible adult MUST accompany you at the time of discharge and stay with you for 24 hours after your surgery. You may NOT drive yourself home after surgery.    Do not drive, operate machinery, make critical decisions or do activities that require co-ordination or balance until after a night’s sleep.   Do not drink alcoholic beverages for 24 hours.   Instructions for resuming your medications will be provided by your surgeon.    CALL YOUR DOCTOR AFTER SURGERY IF YOU HAVE:     Chills and/or a fever of 101° F or higher.    Redness, swelling, pus or drainage from your surgical wound or a bad smell from the wound.    Lightheadedness, fainting or confusion.    Persistent vomiting (throwing up) and are not able to eat or drink for 12 hours.    Three or more loose, watery bowel movements in 24 hours (diarrhea).   Difficulty or pain while urinating( after non-urological surgery)    Pain and swelling in your legs, especially if it is only on one side.    Difficulty breathing or are breathing faster than normal.    Any new concerning symptoms.

## 2025-07-29 NOTE — CPM/PAT H&P
CPM/PAT Evaluation       Name: Stuart Merlos (Stuart Merlos)  /Age: 1975/50 y.o.     In-Person       Chief Complaint: Abdominal pain    HPI: Stuart Merlos is a 50 year old male with complaints of abdominal pain for the past 5-7 years. He states he has RUQ pain with nausea, vomiting, constipation, and diarrhea. He states he has occasional fevers and sweats. He states he has abdominal pain with any foods he consumes. US of right upper quadrant shows:  IMPRESSION:  There is perhaps some minimal gravel within gallbladder neck with no  cholecystitis or biliary ductal dilatation observed.    He is scheduled for a cholecystectomy. He denies chest pain, SOB, dizziness,and palpitations.     Medical History[1]    Surgical History[2]    Social History     Tobacco Use    Smoking status: Never    Smokeless tobacco: Never   Substance Use Topics    Alcohol use: Yes     Comment: socially     Social History     Substance and Sexual Activity   Drug Use Never         Family History[3]    Allergies[4]  Current Outpatient Medications   Medication Sig Dispense Refill    acetaminophen (Tylenol 8 HOUR) 650 mg ER tablet Take 2 tablets (1,300 mg) by mouth every 8 hours if needed for mild pain (1 - 3). Do not crush, chew, or split.      multivitamin tablet Take 1 tablet by mouth once daily.      losartan (Cozaar) 25 mg tablet Take 1 tablet (25 mg) by mouth once daily. (Patient not taking: Reported on 2025) 30 tablet 11     No current facility-administered medications for this visit.        Review of Systems   Constitutional:  Positive for diaphoresis and fever.   HENT: Negative.     Eyes: Negative.    Respiratory: Negative.     Cardiovascular: Negative.    Gastrointestinal:  Positive for abdominal pain, constipation, diarrhea, nausea and vomiting.   Endocrine: Negative.    Genitourinary:  Positive for difficulty urinating and flank pain.   Musculoskeletal:  Positive for back pain.   Skin: Negative.    Allergic/Immunologic:  "Negative.    Neurological: Negative.    Hematological: Negative.    Psychiatric/Behavioral: Negative.               Physical Exam  Vitals reviewed.   Constitutional:       Appearance: Normal appearance.   HENT:      Head: Normocephalic and atraumatic.      Nose: Nose normal.      Mouth/Throat:      Mouth: Mucous membranes are moist.      Pharynx: Oropharynx is clear.     Eyes:      Extraocular Movements: Extraocular movements intact.      Conjunctiva/sclera: Conjunctivae normal.       Cardiovascular:      Rate and Rhythm: Normal rate and regular rhythm.      Pulses: Normal pulses.      Heart sounds: Normal heart sounds.   Pulmonary:      Effort: Pulmonary effort is normal.      Breath sounds: Normal breath sounds.   Abdominal:      Palpations: Abdomen is soft.   Genitourinary:     Comments: Assessment deferred to physician    Musculoskeletal:         General: Normal range of motion.      Cervical back: Normal range of motion.     Skin:     General: Skin is warm and dry.     Neurological:      General: No focal deficit present.      Mental Status: He is alert and oriented to person, place, and time.     Psychiatric:         Mood and Affect: Mood normal.         Behavior: Behavior normal.         Thought Content: Thought content normal.         Judgment: Judgment normal.          PAT AIRWAY:   Airway:     Mallampati::  I    TM distance::  >3 FB    Neck ROM::  Full  normal        Visit Vitals  /83   Pulse 76   Temp 36.3 °C (97.3 °F) (Temporal)   Resp 18   Ht 1.854 m (6' 1\")   Wt 78.4 kg (172 lb 14.4 oz)   SpO2 100%   BMI 22.81 kg/m²   Smoking Status Never   BSA 2.01 m²     ASA: III  CHADS2: 2.8%  RCRI: 0.4%  DASI: 42.7  METS: 8  STOP BAN      Assessment and Plan:     Calculus of gallbladder with chronic cholecystitis with obstruction : CHOLECYSTECTOMY, LAPAROSCOPIC, WITH CHOLANGIOGRAM   Nonischemic dilated cardiomyopathy: [employer] ICD  Hypertension: Patient was prescribed losartan and states he has not " started medication  Hypothyroid: patient states he took himself off medication. Last TSH 5/5/25 2.99 WNL  IBS-D, significant GI problems  Depression, anxiety, multiple somatic issues    LABS: CMP 5/5/25, CBC 5/8/25, UA ordered in PAT due to difficulty urinating and pain  ICD device check 6/9/25  TTE 11/13/24  CONCLUSIONS:   1. Left ventricular ejection fraction is normal, calculated by Nelson's biplane at 60%.   2. There is low normal right ventricular systolic function.   3. Compared with study dated 4/3/2023, there is an increase in calculated LVEF from 50 to 55% up to 60% as well as a reduction in left ventricular hypertrophy.      Alina Rod, APRN-CNP           [1]   Past Medical History:  Diagnosis Date    Body mass index (BMI) 27.0-27.9, adult 10/11/2021    Body mass index (BMI) of 27.0 to 27.9 in adult    Heart disease     Hypertension    [2]   Past Surgical History:  Procedure Laterality Date    HERNIA REPAIR  2021    INSERT / REPLACE / REMOVE PACEMAKER     [3]   Family History  Problem Relation Name Age of Onset    Heart disease Mother      Diabetes Mother      Heart disease Father     [4]   Allergies  Allergen Reactions    Atorvastatin Unknown     muscle aches    Fatigue, muscle aches, confusion, palpitations    Penicillins Unknown

## 2025-07-30 ENCOUNTER — APPOINTMENT (OUTPATIENT)
Dept: PREADMISSION TESTING | Facility: HOSPITAL | Age: 50
End: 2025-07-30
Payer: COMMERCIAL

## 2025-07-31 ENCOUNTER — APPOINTMENT (OUTPATIENT)
Dept: PREADMISSION TESTING | Facility: HOSPITAL | Age: 50
End: 2025-07-31
Payer: COMMERCIAL

## 2025-08-05 ENCOUNTER — ANESTHESIA EVENT (OUTPATIENT)
Dept: OPERATING ROOM | Facility: HOSPITAL | Age: 50
End: 2025-08-05
Payer: COMMERCIAL

## 2025-08-06 ENCOUNTER — APPOINTMENT (OUTPATIENT)
Dept: RADIOLOGY | Facility: HOSPITAL | Age: 50
End: 2025-08-06
Payer: COMMERCIAL

## 2025-08-06 ENCOUNTER — ANESTHESIA (OUTPATIENT)
Dept: OPERATING ROOM | Facility: HOSPITAL | Age: 50
End: 2025-08-06
Payer: COMMERCIAL

## 2025-08-06 ENCOUNTER — HOSPITAL ENCOUNTER (OUTPATIENT)
Facility: HOSPITAL | Age: 50
Setting detail: OUTPATIENT SURGERY
Discharge: HOME | End: 2025-08-06
Attending: SURGERY | Admitting: SURGERY
Payer: COMMERCIAL

## 2025-08-06 ENCOUNTER — PHARMACY VISIT (OUTPATIENT)
Dept: PHARMACY | Facility: CLINIC | Age: 50
End: 2025-08-06
Payer: COMMERCIAL

## 2025-08-06 VITALS
WEIGHT: 172.84 LBS | SYSTOLIC BLOOD PRESSURE: 129 MMHG | OXYGEN SATURATION: 97 % | DIASTOLIC BLOOD PRESSURE: 72 MMHG | RESPIRATION RATE: 18 BRPM | HEART RATE: 69 BPM | TEMPERATURE: 96.8 F | HEIGHT: 73 IN | BODY MASS INDEX: 22.91 KG/M2

## 2025-08-06 DIAGNOSIS — K80.11 CALCULUS OF GALLBLADDER WITH CHRONIC CHOLECYSTITIS WITH OBSTRUCTION: ICD-10-CM

## 2025-08-06 DIAGNOSIS — G89.18 ACUTE POST-OPERATIVE PAIN: Primary | ICD-10-CM

## 2025-08-06 DIAGNOSIS — R14.0 ABDOMINAL BLOATING: ICD-10-CM

## 2025-08-06 PROCEDURE — 47563 LAPARO CHOLECYSTECTOMY/GRAPH: CPT | Performed by: SURGERY

## 2025-08-06 PROCEDURE — 3600000009 HC OR TIME - EACH INCREMENTAL 1 MINUTE - PROCEDURE LEVEL FOUR: Performed by: SURGERY

## 2025-08-06 PROCEDURE — 2500000001 HC RX 250 WO HCPCS SELF ADMINISTERED DRUGS (ALT 637 FOR MEDICARE OP): Performed by: ANESTHESIOLOGY

## 2025-08-06 PROCEDURE — 76000 FLUOROSCOPY <1 HR PHYS/QHP: CPT

## 2025-08-06 PROCEDURE — 7100000009 HC PHASE TWO TIME - INITIAL BASE CHARGE: Performed by: SURGERY

## 2025-08-06 PROCEDURE — 7100000002 HC RECOVERY ROOM TIME - EACH INCREMENTAL 1 MINUTE: Performed by: SURGERY

## 2025-08-06 PROCEDURE — 2500000004 HC RX 250 GENERAL PHARMACY W/ HCPCS (ALT 636 FOR OP/ED)

## 2025-08-06 PROCEDURE — A47563 PR LAP,CHOLECYSTECTOMY/GRAPH: Performed by: ANESTHESIOLOGY

## 2025-08-06 PROCEDURE — 74300 X-RAY BILE DUCTS/PANCREAS: CPT | Performed by: SURGERY

## 2025-08-06 PROCEDURE — 7100000001 HC RECOVERY ROOM TIME - INITIAL BASE CHARGE: Performed by: SURGERY

## 2025-08-06 PROCEDURE — 3700000001 HC GENERAL ANESTHESIA TIME - INITIAL BASE CHARGE: Performed by: SURGERY

## 2025-08-06 PROCEDURE — A4550 SURGICAL TRAYS: HCPCS | Performed by: SURGERY

## 2025-08-06 PROCEDURE — 2500000005 HC RX 250 GENERAL PHARMACY W/O HCPCS: Performed by: SURGERY

## 2025-08-06 PROCEDURE — 3600000004 HC OR TIME - INITIAL BASE CHARGE - PROCEDURE LEVEL FOUR: Performed by: SURGERY

## 2025-08-06 PROCEDURE — RXMED WILLOW AMBULATORY MEDICATION CHARGE

## 2025-08-06 PROCEDURE — 2500000004 HC RX 250 GENERAL PHARMACY W/ HCPCS (ALT 636 FOR OP/ED): Performed by: SURGERY

## 2025-08-06 PROCEDURE — A47563 PR LAP,CHOLECYSTECTOMY/GRAPH

## 2025-08-06 PROCEDURE — 2550000001 HC RX 255 CONTRASTS: Mod: JW | Performed by: SURGERY

## 2025-08-06 PROCEDURE — 2500000004 HC RX 250 GENERAL PHARMACY W/ HCPCS (ALT 636 FOR OP/ED): Mod: JZ | Performed by: ANESTHESIOLOGY

## 2025-08-06 PROCEDURE — 88304 TISSUE EXAM BY PATHOLOGIST: CPT | Mod: TC,WESLAB | Performed by: SURGERY

## 2025-08-06 PROCEDURE — 2780000003 HC OR 278 NO HCPCS: Performed by: SURGERY

## 2025-08-06 PROCEDURE — 3700000002 HC GENERAL ANESTHESIA TIME - EACH INCREMENTAL 1 MINUTE: Performed by: SURGERY

## 2025-08-06 PROCEDURE — 2720000007 HC OR 272 NO HCPCS: Performed by: SURGERY

## 2025-08-06 PROCEDURE — 7100000010 HC PHASE TWO TIME - EACH INCREMENTAL 1 MINUTE: Performed by: SURGERY

## 2025-08-06 RX ORDER — METOCLOPRAMIDE 10 MG/1
10 TABLET ORAL ONCE
Status: COMPLETED | OUTPATIENT
Start: 2025-08-06 | End: 2025-08-06

## 2025-08-06 RX ORDER — HYDROMORPHONE HYDROCHLORIDE 0.2 MG/ML
0.1 INJECTION INTRAMUSCULAR; INTRAVENOUS; SUBCUTANEOUS EVERY 5 MIN PRN
Status: DISCONTINUED | OUTPATIENT
Start: 2025-08-06 | End: 2025-08-06 | Stop reason: HOSPADM

## 2025-08-06 RX ORDER — HYDROMORPHONE HYDROCHLORIDE 1 MG/ML
INJECTION, SOLUTION INTRAMUSCULAR; INTRAVENOUS; SUBCUTANEOUS AS NEEDED
Status: DISCONTINUED | OUTPATIENT
Start: 2025-08-06 | End: 2025-08-06

## 2025-08-06 RX ORDER — CIPROFLOXACIN 2 MG/ML
400 INJECTION, SOLUTION INTRAVENOUS ONCE
Status: COMPLETED | OUTPATIENT
Start: 2025-08-06 | End: 2025-08-06

## 2025-08-06 RX ORDER — LIDOCAINE HYDROCHLORIDE 20 MG/ML
INJECTION, SOLUTION EPIDURAL; INFILTRATION; INTRACAUDAL; PERINEURAL AS NEEDED
Status: DISCONTINUED | OUTPATIENT
Start: 2025-08-06 | End: 2025-08-06

## 2025-08-06 RX ORDER — LABETALOL HYDROCHLORIDE 5 MG/ML
10 INJECTION, SOLUTION INTRAVENOUS ONCE AS NEEDED
Status: DISCONTINUED | OUTPATIENT
Start: 2025-08-06 | End: 2025-08-06 | Stop reason: HOSPADM

## 2025-08-06 RX ORDER — ALBUTEROL SULFATE 0.83 MG/ML
2.5 SOLUTION RESPIRATORY (INHALATION) ONCE
Status: DISCONTINUED | OUTPATIENT
Start: 2025-08-06 | End: 2025-08-06 | Stop reason: HOSPADM

## 2025-08-06 RX ORDER — SODIUM CHLORIDE, SODIUM LACTATE, POTASSIUM CHLORIDE, CALCIUM CHLORIDE 600; 310; 30; 20 MG/100ML; MG/100ML; MG/100ML; MG/100ML
INJECTION, SOLUTION INTRAVENOUS CONTINUOUS PRN
Status: DISCONTINUED | OUTPATIENT
Start: 2025-08-06 | End: 2025-08-06

## 2025-08-06 RX ORDER — PROPOFOL 10 MG/ML
INJECTION, EMULSION INTRAVENOUS AS NEEDED
Status: DISCONTINUED | OUTPATIENT
Start: 2025-08-06 | End: 2025-08-06

## 2025-08-06 RX ORDER — DIPHENHYDRAMINE HYDROCHLORIDE 50 MG/ML
12.5 INJECTION, SOLUTION INTRAMUSCULAR; INTRAVENOUS ONCE AS NEEDED
Status: DISCONTINUED | OUTPATIENT
Start: 2025-08-06 | End: 2025-08-06 | Stop reason: HOSPADM

## 2025-08-06 RX ORDER — MIDAZOLAM HYDROCHLORIDE 1 MG/ML
1 INJECTION, SOLUTION INTRAMUSCULAR; INTRAVENOUS ONCE AS NEEDED
Status: DISCONTINUED | OUTPATIENT
Start: 2025-08-06 | End: 2025-08-06 | Stop reason: HOSPADM

## 2025-08-06 RX ORDER — FENTANYL CITRATE 50 UG/ML
INJECTION, SOLUTION INTRAMUSCULAR; INTRAVENOUS AS NEEDED
Status: DISCONTINUED | OUTPATIENT
Start: 2025-08-06 | End: 2025-08-06

## 2025-08-06 RX ORDER — OXYCODONE HYDROCHLORIDE 5 MG/1
5 TABLET ORAL ONCE AS NEEDED
Status: COMPLETED | OUTPATIENT
Start: 2025-08-06 | End: 2025-08-06

## 2025-08-06 RX ORDER — HYDRALAZINE HYDROCHLORIDE 20 MG/ML
10 INJECTION INTRAMUSCULAR; INTRAVENOUS EVERY 30 MIN PRN
Status: DISCONTINUED | OUTPATIENT
Start: 2025-08-06 | End: 2025-08-06 | Stop reason: HOSPADM

## 2025-08-06 RX ORDER — MIDAZOLAM HYDROCHLORIDE 1 MG/ML
INJECTION, SOLUTION INTRAMUSCULAR; INTRAVENOUS AS NEEDED
Status: DISCONTINUED | OUTPATIENT
Start: 2025-08-06 | End: 2025-08-06

## 2025-08-06 RX ORDER — KETOROLAC TROMETHAMINE 15 MG/ML
15 INJECTION, SOLUTION INTRAMUSCULAR; INTRAVENOUS ONCE AS NEEDED
Status: DISCONTINUED | OUTPATIENT
Start: 2025-08-06 | End: 2025-08-06 | Stop reason: HOSPADM

## 2025-08-06 RX ORDER — ONDANSETRON HYDROCHLORIDE 2 MG/ML
INJECTION, SOLUTION INTRAVENOUS AS NEEDED
Status: DISCONTINUED | OUTPATIENT
Start: 2025-08-06 | End: 2025-08-06

## 2025-08-06 RX ORDER — MEPERIDINE HYDROCHLORIDE 25 MG/ML
12.5 INJECTION INTRAMUSCULAR; INTRAVENOUS; SUBCUTANEOUS EVERY 10 MIN PRN
Status: DISCONTINUED | OUTPATIENT
Start: 2025-08-06 | End: 2025-08-06 | Stop reason: HOSPADM

## 2025-08-06 RX ORDER — METRONIDAZOLE 500 MG/100ML
500 INJECTION, SOLUTION INTRAVENOUS ONCE
Status: COMPLETED | OUTPATIENT
Start: 2025-08-06 | End: 2025-08-06

## 2025-08-06 RX ORDER — ALBUTEROL SULFATE 0.83 MG/ML
2.5 SOLUTION RESPIRATORY (INHALATION) ONCE AS NEEDED
Status: DISCONTINUED | OUTPATIENT
Start: 2025-08-06 | End: 2025-08-06 | Stop reason: HOSPADM

## 2025-08-06 RX ORDER — DOCUSATE SODIUM 100 MG/1
100 CAPSULE, LIQUID FILLED ORAL 2 TIMES DAILY
Qty: 14 CAPSULE | Refills: 0 | Status: SHIPPED | OUTPATIENT
Start: 2025-08-06 | End: 2025-08-13

## 2025-08-06 RX ORDER — ONDANSETRON HYDROCHLORIDE 2 MG/ML
4 INJECTION, SOLUTION INTRAVENOUS EVERY 8 HOURS PRN
Status: CANCELLED | OUTPATIENT
Start: 2025-08-06

## 2025-08-06 RX ORDER — OXYCODONE HYDROCHLORIDE 5 MG/1
5 TABLET ORAL EVERY 6 HOURS PRN
Qty: 16 TABLET | Refills: 0 | Status: SHIPPED | OUTPATIENT
Start: 2025-08-06 | End: 2025-08-10

## 2025-08-06 RX ORDER — KETOROLAC TROMETHAMINE 30 MG/ML
INJECTION, SOLUTION INTRAMUSCULAR; INTRAVENOUS AS NEEDED
Status: DISCONTINUED | OUTPATIENT
Start: 2025-08-06 | End: 2025-08-06

## 2025-08-06 RX ORDER — NORETHINDRONE AND ETHINYL ESTRADIOL 0.5-0.035
10 KIT ORAL EVERY 5 MIN PRN
Status: DISCONTINUED | OUTPATIENT
Start: 2025-08-06 | End: 2025-08-06 | Stop reason: HOSPADM

## 2025-08-06 RX ORDER — LIDOCAINE HYDROCHLORIDE 10 MG/ML
0.1 INJECTION, SOLUTION INFILTRATION; PERINEURAL ONCE
Status: DISCONTINUED | OUTPATIENT
Start: 2025-08-06 | End: 2025-08-06 | Stop reason: HOSPADM

## 2025-08-06 RX ORDER — ONDANSETRON HYDROCHLORIDE 2 MG/ML
4 INJECTION, SOLUTION INTRAVENOUS ONCE AS NEEDED
Status: DISCONTINUED | OUTPATIENT
Start: 2025-08-06 | End: 2025-08-06 | Stop reason: HOSPADM

## 2025-08-06 RX ORDER — NORETHINDRONE AND ETHINYL ESTRADIOL 0.5-0.035
50 KIT ORAL ONCE AS NEEDED
Status: DISCONTINUED | OUTPATIENT
Start: 2025-08-06 | End: 2025-08-06 | Stop reason: HOSPADM

## 2025-08-06 RX ORDER — INDOCYANINE GREEN AND WATER 25 MG
5 KIT INJECTION ONCE
Status: COMPLETED | OUTPATIENT
Start: 2025-08-06 | End: 2025-08-06

## 2025-08-06 RX ORDER — FAMOTIDINE 20 MG/1
20 TABLET, FILM COATED ORAL ONCE
Status: COMPLETED | OUTPATIENT
Start: 2025-08-06 | End: 2025-08-06

## 2025-08-06 RX ORDER — GLYCOPYRROLATE 0.2 MG/ML
0.2 INJECTION INTRAMUSCULAR; INTRAVENOUS ONCE
Status: DISCONTINUED | OUTPATIENT
Start: 2025-08-06 | End: 2025-08-06 | Stop reason: HOSPADM

## 2025-08-06 RX ORDER — ONDANSETRON 4 MG/1
4 TABLET, ORALLY DISINTEGRATING ORAL ONCE AS NEEDED
Status: CANCELLED | OUTPATIENT
Start: 2025-08-06

## 2025-08-06 RX ORDER — ROCURONIUM BROMIDE 10 MG/ML
INJECTION, SOLUTION INTRAVENOUS AS NEEDED
Status: DISCONTINUED | OUTPATIENT
Start: 2025-08-06 | End: 2025-08-06

## 2025-08-06 RX ADMIN — HYDROMORPHONE HYDROCHLORIDE 0.4 MG: 1 INJECTION, SOLUTION INTRAMUSCULAR; INTRAVENOUS; SUBCUTANEOUS at 09:18

## 2025-08-06 RX ADMIN — HYDROMORPHONE HYDROCHLORIDE 0.2 MG: 1 INJECTION, SOLUTION INTRAMUSCULAR; INTRAVENOUS; SUBCUTANEOUS at 09:04

## 2025-08-06 RX ADMIN — HYDROMORPHONE HYDROCHLORIDE 0.5 MG: 0.5 INJECTION, SOLUTION INTRAMUSCULAR; INTRAVENOUS; SUBCUTANEOUS at 09:30

## 2025-08-06 RX ADMIN — HYDROMORPHONE HYDROCHLORIDE 0.3 MG: 1 INJECTION, SOLUTION INTRAMUSCULAR; INTRAVENOUS; SUBCUTANEOUS at 08:24

## 2025-08-06 RX ADMIN — FAMOTIDINE 20 MG: 20 TABLET, FILM COATED ORAL at 07:04

## 2025-08-06 RX ADMIN — LIDOCAINE HYDROCHLORIDE 80 MG: 20 INJECTION, SOLUTION EPIDURAL; INFILTRATION; INTRACAUDAL; PERINEURAL at 08:04

## 2025-08-06 RX ADMIN — ONDANSETRON 4 MG: 2 INJECTION, SOLUTION INTRAMUSCULAR; INTRAVENOUS at 09:03

## 2025-08-06 RX ADMIN — INDOCYANINE GREEN AND WATER 5 MG: KIT at 07:12

## 2025-08-06 RX ADMIN — MIDAZOLAM 2 MG: 1 INJECTION INTRAMUSCULAR; INTRAVENOUS at 07:59

## 2025-08-06 RX ADMIN — PROPOFOL 160 MG: 10 INJECTION, EMULSION INTRAVENOUS at 08:04

## 2025-08-06 RX ADMIN — SUGAMMADEX 200 MG: 100 INJECTION, SOLUTION INTRAVENOUS at 09:07

## 2025-08-06 RX ADMIN — KETOROLAC TROMETHAMINE 15 MG: 30 INJECTION, SOLUTION INTRAMUSCULAR at 09:04

## 2025-08-06 RX ADMIN — METRONIDAZOLE 500 MG: 500 INJECTION, SOLUTION INTRAVENOUS at 07:59

## 2025-08-06 RX ADMIN — CIPROFLOXACIN 400 MG: 400 INJECTION, SOLUTION INTRAVENOUS at 08:15

## 2025-08-06 RX ADMIN — METOCLOPRAMIDE 10 MG: 10 TABLET ORAL at 07:04

## 2025-08-06 RX ADMIN — DEXAMETHASONE SODIUM PHOSPHATE 8 MG: 4 INJECTION, SOLUTION INTRAMUSCULAR; INTRAVENOUS at 08:14

## 2025-08-06 RX ADMIN — FENTANYL CITRATE 100 MCG: 50 INJECTION, SOLUTION INTRAMUSCULAR; INTRAVENOUS at 08:04

## 2025-08-06 RX ADMIN — OXYCODONE HYDROCHLORIDE 5 MG: 5 TABLET ORAL at 10:25

## 2025-08-06 RX ADMIN — SODIUM CHLORIDE, POTASSIUM CHLORIDE, SODIUM LACTATE AND CALCIUM CHLORIDE: 600; 310; 30; 20 INJECTION, SOLUTION INTRAVENOUS at 08:01

## 2025-08-06 RX ADMIN — ROCURONIUM BROMIDE 60 MG: 10 INJECTION, SOLUTION INTRAVENOUS at 08:05

## 2025-08-06 ASSESSMENT — PAIN SCALES - GENERAL
PAIN_LEVEL: 2
PAINLEVEL_OUTOF10: 7
PAINLEVEL_OUTOF10: 7
PAINLEVEL_OUTOF10: 0 - NO PAIN
PAINLEVEL_OUTOF10: 6
PAINLEVEL_OUTOF10: 8
PAINLEVEL_OUTOF10: 6

## 2025-08-06 ASSESSMENT — PAIN DESCRIPTION - DESCRIPTORS
DESCRIPTORS: ACHING

## 2025-08-06 ASSESSMENT — PAIN - FUNCTIONAL ASSESSMENT
PAIN_FUNCTIONAL_ASSESSMENT: 0-10
PAIN_FUNCTIONAL_ASSESSMENT: WONG-BAKER FACES
PAIN_FUNCTIONAL_ASSESSMENT: 0-10

## 2025-08-06 ASSESSMENT — PAIN DESCRIPTION - LOCATION: LOCATION: ABDOMEN

## 2025-08-06 ASSESSMENT — PAIN DESCRIPTION - ORIENTATION: ORIENTATION: MID

## 2025-08-06 ASSESSMENT — PAIN SCALES - WONG BAKER
WONGBAKER_NUMERICALRESPONSE: HURTS LITTLE BIT
WONGBAKER_NUMERICALRESPONSE: HURTS LITTLE BIT

## 2025-08-06 NOTE — ANESTHESIA POSTPROCEDURE EVALUATION
Patient: Stuart Merlos    Procedure Summary       Date: 08/06/25 Room / Location: [facility] OR 11 / Virtual [facility] OR    Anesthesia Start: 0801 Anesthesia Stop: 0922    Procedure: CHOLECYSTECTOMY, LAPAROSCOPIC, WITH CHOLANGIOGRAM (Abdomen) Diagnosis:       Calculus of gallbladder with chronic cholecystitis with obstruction      (Calculus of gallbladder with chronic cholecystitis with obstruction [K80.11])    Surgeons: Darius KOTHARI MD Responsible Provider: Keaton Heard DO    Anesthesia Type: general, other ASA Status: 3            Anesthesia Type: general, other    Vitals Value Taken Time   /85 08/06/25 10:01   Temp 36.2 °C (97.2 °F) 08/06/25 10:00   Pulse 78 08/06/25 10:03   Resp 15 08/06/25 10:03   SpO2 90 % 08/06/25 10:03   Vitals shown include unfiled device data.    Anesthesia Post Evaluation    Patient location during evaluation: bedside  Patient participation: complete - patient participated  Level of consciousness: awake  Pain score: 2  Pain management: adequate  Airway patency: patent  Two or more strategies used to mitigate risk of obstructive sleep apnea  Cardiovascular status: acceptable  Respiratory status: acceptable  Hydration status: acceptable  Postoperative Nausea and Vomiting: none  Comments: See intraoperative record for anesthetic actions related to the preoperative anesthesia plan.        There were no known notable events for this encounter.

## 2025-08-06 NOTE — ANESTHESIA PREPROCEDURE EVALUATION
Patient: Stuart Merlos    Procedure Information       Date/Time: 08/06/25 0745    Procedure: CHOLECYSTECTOMY, LAPAROSCOPIC, WITH CHOLANGIOGRAM    Location: [facility] OR 11 / [facility] OR    Surgeons: Darius KOTHARI MD            Relevant Problems   Cardiac   (+) Atypical chest pain   (+) HTN (hypertension)   (+) Hyperlipidemia      Pulmonary   (+) TRACY (dyspnea on exertion)   (+) RICHAR (obstructive sleep apnea)      Neuro   (+) Depression, major, recurrent   (+) Lumbar radiculopathy   (+) Thoracic radiculitis      GI   (+) Dysphagia   (+) Irritable bowel syndrome with diarrhea      Liver   (+) Calculus of gallbladder with chronic cholecystitis with obstruction      Endocrine   (+) Hypothyroidism      HEENT   (+) Chronic congestion of paranasal sinus      ID   (+) Small intestinal bacterial overgrowth       Clinical information reviewed:   Tobacco  Allergies  Meds   Med Hx  Surg Hx   Fam Hx  Soc Hx        NPO Detail:  NPO/Void Status  Date of Last Liquid: 08/05/25  Time of Last Liquid: 2100  Date of Last Solid: 08/05/25  Time of Last Solid: 1900  Last Intake Type: Clear fluids  Time of Last Void: 0520         Physical Exam    Airway  Mallampati: II  TM distance: >3 FB  Mouth opening: 3 or more finger widths     Cardiovascular   Rhythm: regular  Rate: normal     Dental - normal exam     Pulmonary    Abdominal            Anesthesia Plan    History of general anesthesia?: unknown/emergency  History of complications of general anesthesia?: no    ASA 3     general and other   There is reasoning for not using neuraxial anesthesia or a peripheral nerve block.  (Labs acceptable, EKG sr 2023, now with DDD pacer last check 6-2025 no events Apaced, EF 60% echo, plan GETA routine mon.)  Education provided regarding risk of obstructive sleep apnea. (in appropriate circumstances)  intravenous induction   Anesthetic plan and risks discussed with patient.    Plan discussed with CRNA, CAA and attending.

## 2025-08-06 NOTE — ANESTHESIA PROCEDURE NOTES
Airway  Date/Time: 8/6/2025 8:07 AM  Reason: elective    Airway not difficult    Staffing  Performed: IRA   Authorized by: Keaton Heard DO    Performed by: Bennett Paul  Patient location during procedure: OR    Patient Condition  Indications for airway management: anesthesia and airway protection  Patient position: sniffing  Planned trial extubation  Sedation level: deep     Final Airway Details   Preoxygenated: yes  Final airway type: endotracheal airway  Successful airway: ETT  Cuffed: yes   Successful intubation technique: direct laryngoscopy  Adjuncts used in placement: intubating stylet  Endotracheal tube insertion site: oral  Blade: William  Blade size: #4  ETT size (mm): 7.5  Cormack-Lehane Classification: grade I - full view of glottis  Placement verified by: chest auscultation and capnometry   Measured from: lips  ETT to lips (cm): 23  Number of attempts at approach: 1  Number of other approaches attempted: 0    Additional Comments  Atraumatic, dentition and soft tissue as preop. Neck remained neutral throughout.

## 2025-08-06 NOTE — NURSING NOTE
Pt sitting up..  surgical site clean and dry. Ice pack applied.   Tolerates sips of ginger ale and crackers.    Discharge instructions discussed and handed to pt. No further  questions.  .

## 2025-08-06 NOTE — NURSING NOTE
Pt declined type and screen draw at this time with iv start.  Dr webb at bedside. Aware.  No further questions from pt at this time.

## 2025-08-06 NOTE — OP NOTE
CHOLECYSTECTOMY, LAPAROSCOPIC, WITH CHOLANGIOGRAM Operative Note     Date: 2025  OR Location: [facility]    Name: Stuart Merlos : 1975, Age: 50 y.o., MRN: [id number], Sex: male    Diagnosis  Pre-op Diagnosis      * Calculus of gallbladder with chronic cholecystitis with obstruction [K80.11] Post-op Diagnosis     * Calculus of gallbladder with chronic cholecystitis with obstruction [K80.11]     Procedures  CHOLECYSTECTOMY, LAPAROSCOPIC, WITH CHOLANGIOGRAM  42090 - [facility] LAPS SURG CHOLECYSTECTOMY W/CHOLANGIOGRAPHY      Surgeons      * Darius Brizuela V - Primary    Resident/Fellow/Other Assistant:  Surgeons and Role:  * No surgeons found with a matching role *    Staff:   Circulator: Babita Harrison Person: Xiomy  Surgical Assistant: Rylee    Anesthesia Staff: Anesthesiologist: Keaton Heard DO  CRNA: PRADEEP Garza-DARNELL  SRNA: Bennett Paul    Procedure Summary  Anesthesia: Anesthesia type not filed in the log.  ASA: III  Estimated Blood Loss: 20 mL  Intra-op Medications:   Administrations occurring from 0745 to 1000 on 25:   Medication Name Total Dose   lidocaine (Xylocaine) 5 mL, bupivacaine PF (Marcaine) 0.5 % (5 mg/mL) 5 mL syringe 20 mL   iohexol (OMNIPaque) 350 mg iodine/mL solution 6 mL   dexAMETHasone (Decadron) 4 mg/mL IV Syringe 2 mL 8 mg   fentaNYL (Sublimaze) injection 50 mcg/mL 100 mcg   HYDROmorphone (Dilaudid) injection 1 mg/mL 0.5 mg   ketorolac (Toradol) injection 30 mg 15 mg   LR infusion Cannot be calculated   lidocaine PF (Xylocaine-MPF) local injection 2 % 80 mg   midazolam (Versed) injection 1 mg/mL 2 mg   ondansetron (Zofran) 2 mg/mL injection 4 mg   propofol (Diprivan) injection 10 mg/mL 160 mg   rocuronium (ZeMuron) 50 mg/5 mL injection 60 mg   sugammadex (Bridion) 200 mg/2 mL injection 200 mg              Anesthesia Record               Intraprocedure I/O Totals       None           Specimen:   ID Type Source Tests Collected by Time   1 : gallbladder and contents  Tissue GALLBLADDER CHOLECYSTECTOMY SURGICAL PATHOLOGY EXAM Darius KOTHARI MD 8/6/2025 0844          Findings: Contrast passed down cystic duct and into CBD and duodenum without any obstruction     Indications: Stuart Merlos is an 50 y.o. male who is having surgery for Calculus of gallbladder with chronic cholecystitis with obstruction [K80.11].  He presented to my clinic for evaluation of chronic abdominal pains.  He had had prior ultrasound and CT scan that showed some cholelithiasis, without any secondary findings of acute cholecystitis.  He had been having recurrent episodes of right-sided abdominal pain, as well as irregular bowel movements.  I explained to the patient the risks and benefits of going to the operating room for a laparoscopic cholecystectomy.  These risks include risk of bleeding, infection, injury to the bile ducts, or injury to surrounding structures.  I also explained to the patient that taking out the gallbladder may not resolve all of his chronic GI issues, as he is a lot of other issues going on that are not related to the gallbladder.  The patient voiced understanding, and agreed to proceed with the operation.    The patient was seen in the preoperative area. The risks, benefits, complications, treatment options, non-operative alternatives, expected recovery and outcomes were discussed with the patient. The possibilities of reaction to medication, pulmonary aspiration, injury to surrounding structures, bleeding, recurrent infection, the need for additional procedures, failure to diagnose a condition, and creating a complication requiring transfusion or operation were discussed with the patient. The patient concurred with the proposed plan, giving informed consent.  The site of surgery was properly noted/marked if necessary per policy. The patient has been actively warmed in preoperative area. Preoperative antibiotics have been ordered and given within 1 hours of incision. Venous  "thrombosis prophylaxis have been ordered including bilateral sequential compression devices    Procedure Details:       The patient was taken to the operating room, placed supine on the operating table.  Time-out was performed.  General anesthesia was induced.  She received antibiotics.  The abdomen was prepped and draped in a sterile fashion.  We made a Supraumbilical midline incision, placed a 12 mm Hi trocar.  We established insufflation. We placed a 5 mm subxiphoid port and then two 5 mm right subcostal ports.  We retracted the gallbladder up and over the right lobe of the liver.  There were attachments of the omentum to the gallbladder suggesting chronic inflammation, these were taken down.  We then retracted the infundibulum laterally and inferiorly, and this nicely exposed the triangle of Calot.  We circumferentially dissected the cystic duct and cystic artery free from surrounding structures, thus exposing the cystic duct and cystic artery along with the lower 1/3 of the gallbladder fossa, thus achieving the \"critical view.\"  We doubly ligated the cystic artery using clips and divided it.  We then performed a ductotomy.  We placed a clip at the base of the gallbladder/cystic duct junction.  We introduced our cholangiocatheter.  We performed a cholangiogram that showed free flow of contrast into radicals of the liver as well as into the duodenum.  There were no filling defects.  We removed our cholangiocatheter and then doubly ligated the cystic duct and divided it.  We then excised the gallbladder from the inferior aspect of the liver using the cautery.  Once excised, we removed the gallbladder specimen using an Endo Catch bag.  We re-established insufflation and after ensuring hemostasis and placed some Surgicel powder in the gallbladder fossa, we removed our ports, closed our midline fascial defect using 0-Vicryl, and then closed the skin using 4-0 subcuticular Monocryl stitches followed by Dermabond " glue.  The patient tolerated the procedure without difficulty and was returned to the recovery room in stable condition.          Evidence of Infection: No   Complications:  None; patient tolerated the procedure well.    Disposition: PACU - hemodynamically stable.  Condition: stable         Task Performed by RNFA or Surgical Assistant:  No qualified resident was available to assist.     An assistant was used throughout the case and assisted in retraction, visualization, and improved the flow of the case.    This was a laparoscopic case and the assistant was used for maneuvering the camera and visualization.    Attending Attestation: I was present and scrubbed for the entire procedure.    Darius Brizuela V  Phone Number: [phone]

## 2025-08-07 ASSESSMENT — PAIN SCALES - GENERAL: PAINLEVEL_OUTOF10: 7

## 2025-08-13 LAB
LABORATORY COMMENT REPORT: NORMAL
PATH REPORT.FINAL DX SPEC: NORMAL
PATH REPORT.GROSS SPEC: NORMAL
PATH REPORT.RELEVANT HX SPEC: NORMAL
PATH REPORT.TOTAL CANCER: NORMAL
RESIDENT REVIEW: NORMAL

## 2025-08-21 ENCOUNTER — OFFICE VISIT (OUTPATIENT)
Dept: SURGERY | Facility: CLINIC | Age: 50
End: 2025-08-21
Payer: COMMERCIAL

## 2025-08-21 DIAGNOSIS — K80.11 CALCULUS OF GALLBLADDER WITH CHRONIC CHOLECYSTITIS WITH OBSTRUCTION: Primary | ICD-10-CM

## 2025-08-21 PROCEDURE — 99211 OFF/OP EST MAY X REQ PHY/QHP: CPT | Performed by: SURGERY

## 2025-08-21 ASSESSMENT — ENCOUNTER SYMPTOMS
ABDOMINAL PAIN: 0
VOMITING: 0
CONSTIPATION: 0
ABDOMINAL DISTENTION: 0
NAUSEA: 0
CHILLS: 0
FEVER: 0
DIARRHEA: 0

## 2025-08-25 ENCOUNTER — OFFICE VISIT (OUTPATIENT)
Dept: CARDIOLOGY | Facility: HOSPITAL | Age: 50
End: 2025-08-25
Payer: COMMERCIAL

## 2025-08-25 VITALS
HEIGHT: 73 IN | HEART RATE: 77 BPM | SYSTOLIC BLOOD PRESSURE: 131 MMHG | OXYGEN SATURATION: 97 % | DIASTOLIC BLOOD PRESSURE: 88 MMHG | BODY MASS INDEX: 23.33 KG/M2 | WEIGHT: 176 LBS

## 2025-08-25 DIAGNOSIS — I47.29 NONSUSTAINED VENTRICULAR TACHYCARDIA (MULTI): Primary | ICD-10-CM

## 2025-08-25 DIAGNOSIS — I42.8 NICM (NONISCHEMIC CARDIOMYOPATHY) (MULTI): ICD-10-CM

## 2025-08-25 PROCEDURE — 99212 OFFICE O/P EST SF 10 MIN: CPT | Mod: 25

## 2025-08-25 PROCEDURE — 93005 ELECTROCARDIOGRAM TRACING: CPT | Performed by: INTERNAL MEDICINE

## 2025-08-25 PROCEDURE — 3008F BODY MASS INDEX DOCD: CPT | Performed by: INTERNAL MEDICINE

## 2025-08-25 PROCEDURE — 1036F TOBACCO NON-USER: CPT | Performed by: INTERNAL MEDICINE

## 2025-08-25 PROCEDURE — 3079F DIAST BP 80-89 MM HG: CPT | Performed by: INTERNAL MEDICINE

## 2025-08-25 PROCEDURE — 99204 OFFICE O/P NEW MOD 45 MIN: CPT | Performed by: INTERNAL MEDICINE

## 2025-08-25 PROCEDURE — 3075F SYST BP GE 130 - 139MM HG: CPT | Performed by: INTERNAL MEDICINE

## 2025-08-25 PROCEDURE — 93010 ELECTROCARDIOGRAM REPORT: CPT | Performed by: INTERNAL MEDICINE

## 2025-08-25 ASSESSMENT — ENCOUNTER SYMPTOMS
LOSS OF SENSATION IN FEET: 0
DEPRESSION: 0
OCCASIONAL FEELINGS OF UNSTEADINESS: 0

## 2025-08-26 LAB
ATRIAL RATE: 77 BPM
P AXIS: 35 DEGREES
P OFFSET: 194 MS
P ONSET: 131 MS
PR INTERVAL: 172 MS
Q ONSET: 217 MS
QRS COUNT: 13 BEATS
QRS DURATION: 116 MS
QT INTERVAL: 402 MS
QTC CALCULATION(BAZETT): 454 MS
QTC FREDERICIA: 436 MS
R AXIS: 10 DEGREES
T AXIS: 47 DEGREES
T OFFSET: 418 MS
VENTRICULAR RATE: 77 BPM

## 2025-09-11 ENCOUNTER — APPOINTMENT (OUTPATIENT)
Dept: RHEUMATOLOGY | Facility: CLINIC | Age: 50
End: 2025-09-11
Payer: COMMERCIAL

## 2025-09-25 ENCOUNTER — APPOINTMENT (OUTPATIENT)
Age: 50
End: 2025-09-25
Payer: COMMERCIAL

## 2025-10-30 ENCOUNTER — APPOINTMENT (OUTPATIENT)
Dept: RHEUMATOLOGY | Facility: CLINIC | Age: 50
End: 2025-10-30
Payer: COMMERCIAL

## (undated) DEVICE — Device

## (undated) DEVICE — RETRIEVER, ENDOPOUCH, SPEC BAG

## (undated) DEVICE — SYRINGE, 10 CC, LUER LOCK

## (undated) DEVICE — ADHESIVE, SKIN, DERMABOND ADVANCED, 15CM, PEN-STYLE

## (undated) DEVICE — APPLICATOR, ENDOSCOPIC, F/SURGICEL POWDER

## (undated) DEVICE — CLIP, ENDO APPLIER LIGAMAX 5MM

## (undated) DEVICE — SUTURE, MONOCRYL, 4-0, 27 IN, PS-2, UNDYED

## (undated) DEVICE — PUMP, STRYKERFLOW 2 & HANDPIECE W/10FT. IRRIGATION TUBING

## (undated) DEVICE — EXTENSION SET, IV, MACROBORE, 30IN, NON DEHP

## (undated) DEVICE — NEEDLE, HYPODERMIC, PROEDGE, 22G X 1.5, BLACK

## (undated) DEVICE — TUBE SET, PNEUMOCLEAR, SMOKE EVACU, HIGH-FLOW

## (undated) DEVICE — CLEAN KIT, ANTIFOG SCOPE, SEE SHARP 100MM

## (undated) DEVICE — IRRIGATOR, WOUND, HYDRO SURG PLUS, W/O TIP, DISP

## (undated) DEVICE — HEMOSTAT, SURGICEL POWDER 3.0GRAMS

## (undated) DEVICE — DRAPE, C-ARM IMAGE

## (undated) DEVICE — DRAPE, TIBURON W/ADHESIVE, 19 X 30

## (undated) DEVICE — ELECTRODE, EZ CLEAN LAPAROSCOPIC L-WIRE, 33CM, DISP

## (undated) DEVICE — SYRINGE, 50 CC, LUER LOCK

## (undated) DEVICE — TROCAR, KII OPTICAL BLADELESS 5MM Z THREAD 100MM LNGTH

## (undated) DEVICE — SOLUTION, INJECTION, USP, LACTATED RINGERS, LIFECARE, 1000ML

## (undated) DEVICE — GRASPER, WITH RATCHET HANDLE, 5MM

## (undated) DEVICE — GLOVE, SURGICAL, PROTEXIS PI , 7.0, PF, LF

## (undated) DEVICE — SLEEVE, KII, Z-THREAD, 5X100CM

## (undated) DEVICE — DRAPE, SHEET, LARGE, 70 X 85IN, STERILE

## (undated) DEVICE — GOWN, SURGICAL, SIRUS, NON REINFORCED, LARGE